# Patient Record
Sex: FEMALE | Race: BLACK OR AFRICAN AMERICAN | Employment: FULL TIME | URBAN - METROPOLITAN AREA
[De-identification: names, ages, dates, MRNs, and addresses within clinical notes are randomized per-mention and may not be internally consistent; named-entity substitution may affect disease eponyms.]

---

## 2017-03-22 LAB
HPV COMMENT: NORMAL
HPV TYPE 16: NOT DETECTED
HPV TYPE 18: NOT DETECTED
HPVOH (OTHER TYPES): NOT DETECTED

## 2018-01-12 ENCOUNTER — OFFICE VISIT (OUTPATIENT)
Dept: URGENT CARE | Age: 48
End: 2018-01-12

## 2018-01-12 VITALS
WEIGHT: 152 LBS | BODY MASS INDEX: 30.64 KG/M2 | SYSTOLIC BLOOD PRESSURE: 112 MMHG | HEART RATE: 86 BPM | DIASTOLIC BLOOD PRESSURE: 76 MMHG | TEMPERATURE: 98.3 F | HEIGHT: 59 IN | RESPIRATION RATE: 12 BRPM

## 2018-01-12 DIAGNOSIS — J02.9 SORE THROAT: ICD-10-CM

## 2018-01-12 DIAGNOSIS — J06.9 ACUTE URI: Primary | ICD-10-CM

## 2018-01-12 DIAGNOSIS — R52 BODY ACHES: ICD-10-CM

## 2018-01-12 LAB
INFLUENZA A ANTIBODY: NORMAL
INFLUENZA B ANTIBODY: NORMAL
S PYO AG THROAT QL: NORMAL

## 2018-01-12 PROCEDURE — 87880 STREP A ASSAY W/OPTIC: CPT | Performed by: EMERGENCY MEDICINE

## 2018-01-12 PROCEDURE — 87804 INFLUENZA ASSAY W/OPTIC: CPT | Performed by: EMERGENCY MEDICINE

## 2018-01-12 PROCEDURE — 99203 OFFICE O/P NEW LOW 30 MIN: CPT | Performed by: EMERGENCY MEDICINE

## 2018-01-12 RX ORDER — BENZONATATE 200 MG/1
200 CAPSULE ORAL 3 TIMES DAILY PRN
Qty: 15 CAPSULE | Refills: 0 | Status: SHIPPED | OUTPATIENT
Start: 2018-01-12 | End: 2021-02-18

## 2018-01-12 RX ORDER — NITROFURANTOIN 25; 75 MG/1; MG/1
100 CAPSULE ORAL 2 TIMES DAILY
COMMUNITY
End: 2021-02-18

## 2018-01-12 RX ORDER — GUAIFENESIN DEXTROMETHORPHAN HYDROBROMIDE ORAL SOLUTION 10; 100 MG/5ML; MG/5ML
10 SOLUTION ORAL EVERY 4 HOURS PRN
Qty: 237 ML | Refills: 0 | Status: SHIPPED | OUTPATIENT
Start: 2018-01-12 | End: 2021-02-18

## 2018-01-12 ASSESSMENT — ENCOUNTER SYMPTOMS
ABDOMINAL PAIN: 0
RHINORRHEA: 1
EYE PAIN: 0
DIARRHEA: 0
COUGH: 1
SORE THROAT: 1
SHORTNESS OF BREATH: 0
WHEEZING: 0
VOMITING: 0
NAUSEA: 0
EYE DISCHARGE: 0
TROUBLE SWALLOWING: 0

## 2018-01-12 NOTE — PATIENT INSTRUCTIONS
RX: TESSALON PERLES, TUSSIN DM MAX    Throat sprays, lozenges, salt-water gargles for comfort  Tylenol or Ibuprofen for pain/fever (may alternate up to every 3 hours as needed)  Plenty of fluids    Follow-up with your primary care physician in 1 week if not improving. If you do not have a primary care physician, call [phone] for a referral or visit [email or web address]    Continue Bactrim DS for UTI as directed      Patient Education        Upper Respiratory Infection (Cold): Care Instructions  Your Care Instructions    An upper respiratory infection, or URI, is an infection of the nose, sinuses, or throat. URIs are spread by coughs, sneezes, and direct contact. The common cold is the most frequent kind of URI. The flu and sinus infections are other kinds of URIs. Almost all URIs are caused by viruses. Antibiotics won't cure them. But you can treat most infections with home care. This may include drinking lots of fluids and taking over-the-counter pain medicine. You will probably feel better in 4 to 10 days. The doctor has checked you carefully, but problems can develop later. If you notice any problems or new symptoms, get medical treatment right away. Follow-up care is a key part of your treatment and safety. Be sure to make and go to all appointments, and call your doctor if you are having problems. It's also a good idea to know your test results and keep a list of the medicines you take. How can you care for yourself at home? · To prevent dehydration, drink plenty of fluids, enough so that your urine is light yellow or clear like water. Choose water and other caffeine-free clear liquids until you feel better. If you have kidney, heart, or liver disease and have to limit fluids, talk with your doctor before you increase the amount of fluids you drink. · Take an over-the-counter pain medicine, such as acetaminophen (Tylenol), ibuprofen (Advil, Motrin), or naproxen (Aleve).  Read and follow all instructions on the label. · Before you use cough and cold medicines, check the label. These medicines may not be safe for young children or for people with certain health problems. · Be careful when taking over-the-counter cold or flu medicines and Tylenol at the same time. Many of these medicines have acetaminophen, which is Tylenol. Read the labels to make sure that you are not taking more than the recommended dose. Too much acetaminophen (Tylenol) can be harmful. · Get plenty of rest.  · Do not smoke or allow others to smoke around you. If you need help quitting, talk to your doctor about stop-smoking programs and medicines. These can increase your chances of quitting for good. When should you call for help? Call 911 anytime you think you may need emergency care. For example, call if:  ? · You have severe trouble breathing. ?Call your doctor now or seek immediate medical care if:  ? · You seem to be getting much sicker. ? · You have new or worse trouble breathing. ? · You have a new or higher fever. ? · You have a new rash. ? Watch closely for changes in your health, and be sure to contact your doctor if:  ? · You have a new symptom, such as a sore throat, an earache, or sinus pain. ? · You cough more deeply or more often, especially if you notice more mucus or a change in the color of your mucus. ? · You do not get better as expected. Where can you learn more? Go to https://[employer]. org and sign in to your doxo account. Enter K685 in the [employer] box to learn more about \"Upper Respiratory Infection (Cold): Care Instructions. \"     If you do not have an account, please click on the \"Sign Up Now\" link. Current as of: May 12, 2017  Content Version: 11.5  © 7307-7483 Healthwise, Incorporated. Care instructions adapted under license by [facility] ([facility]).  If you have questions about a medical condition or this instruction, always ask your healthcare professional.

## 2018-01-12 NOTE — LETTER
[address] [facility] [address] [place] [id number]  Phone: [phone]  Fax: [phone]    Soila Menendez MD        January 12, 2018     Patient: Candace Ellington   YOB: 1970   Date of Visit: 1/12/2018       To Whom it May Concern:    Elida Kingston was seen in my clinic on 1/12/2018. She may return to work on 1/13/2018. If you have any questions or concerns, please don't hesitate to call.     Sincerely,         Soila Menendez MD

## 2018-01-12 NOTE — PROGRESS NOTES
Reason for Visit:   Chief Complaint   Patient presents with    URI     pt complaining of bodyaches, congestion, sore throat and chills since yesterday. Patient History     HPI:    URI    This is a new problem. The current episode started yesterday. Maximum temperature: + subjective fever. Associated symptoms include congestion, coughing (slight), headaches, neck pain (muscle soreness), a plugged ear sensation, rhinorrhea, sneezing and a sore throat. Pertinent negatives include no abdominal pain, chest pain, diarrhea, dysuria (but has been treated with Bactrim DS for a UTI x 3 days), ear pain, nausea, rash, vomiting or wheezing. She has tried nothing for the symptoms. Past Medical History:   Diagnosis Date    Anxiety 2012    Asthma     Depression 2013    DVT of lower extremity, bilateral ([address]  and     while pregnant    Dysmenorrhea 10/14/2011    Insomnia 2013    Migraine headache 11/10/2010    Obesity 2010    Panic attacks 10/17/2012       Past Surgical History:   Procedure Laterality Date     SECTION  1991     SECTION  1995   Darcy Johnson ECTOPIC PREGNANCY SURGERY  2002    right salpingo-oopherectomy    ENDOMETRIAL ABLATION  2012    KNEE ARTHROSCOPY Right     Dr. Salvador [address] [facility]  1995       Allergies:    Allergies   Allergen Reactions    Amoxicillin Hives, Shortness Of Breath, Itching and Swelling     tongue swelling    Penicillins Hives, Shortness Of Breath, Itching and Swelling    Red Dye Hives, Itching and Swelling     The patient had a skin reaction to a red hair dye NOT oral medicines          Review of Systems       Review of Systems   Constitutional: Positive for chills and ?Call your doctor now or seek immediate medical care if:  ? · You seem to be getting much sicker. ? · You have new or worse trouble breathing. ? · You have a new or higher fever. ? · You have a new rash. ? Watch closely for changes in your health, and be sure to contact your doctor if:  ? · You have a new symptom, such as a sore throat, an earache, or sinus pain. ? · You cough more deeply or more often, especially if you notice more mucus or a change in the color of your mucus. ? · You do not get better as expected. Where can you learn more? Go to https://Lingvistpepiceweb.[employer]ner[employer]. org and sign in to your [employer] account. Enter W729 in the [employer] box to learn more about \"Upper Respiratory Infection (Cold): Care Instructions. \"     If you do not have an account, please click on the \"Sign Up Now\" link. Current as of: May 12, 2017  Content Version: 11.5  © 5817-8692 Healthwise, Incorporated. Care instructions adapted under license by [facility] ([facility]). If you have questions about a medical condition or this instruction, always ask your healthcare professional. [address] any warranty or liability for your use of this information.

## 2018-03-12 ENCOUNTER — EMPLOYEE WELLNESS (OUTPATIENT)
Dept: OTHER | Age: 48
End: 2018-03-12

## 2018-03-12 LAB
CHOLESTEROL, TOTAL: 257 MG/DL (ref 0–199)
GLUCOSE BLD-MCNC: 69 MG/DL (ref 70–99)
HDLC SERPL-MCNC: 92 MG/DL (ref 40–60)
LDL CHOLESTEROL CALCULATED: 151 MG/DL
TRIGL SERPL-MCNC: 68 MG/DL (ref 0–150)

## 2018-04-02 VITALS — BODY MASS INDEX: 28.48 KG/M2 | WEIGHT: 141 LBS

## 2019-02-04 ENCOUNTER — HOSPITAL ENCOUNTER (EMERGENCY)
Age: 49
Discharge: HOME | End: 2019-02-04
Payer: COMMERCIAL

## 2019-02-04 VITALS
HEART RATE: 88 BPM | BODY MASS INDEX: 28.43 KG/M2 | TEMPERATURE: 98.2 F | WEIGHT: 141 LBS | DIASTOLIC BLOOD PRESSURE: 76 MMHG | OXYGEN SATURATION: 100 % | RESPIRATION RATE: 18 BRPM | SYSTOLIC BLOOD PRESSURE: 136 MMHG | HEIGHT: 59 IN

## 2019-02-04 DIAGNOSIS — J02.9 ACUTE PHARYNGITIS, UNSPECIFIED ETIOLOGY: Primary | ICD-10-CM

## 2019-02-04 LAB
RAPID INFLUENZA  B AGN: NEGATIVE
RAPID INFLUENZA A AGN: NEGATIVE
S PYO AG THROAT QL: NEGATIVE

## 2019-02-04 PROCEDURE — 87081 CULTURE SCREEN ONLY: CPT

## 2019-02-04 PROCEDURE — 87880 STREP A ASSAY W/OPTIC: CPT

## 2019-02-04 PROCEDURE — 6360000002 HC RX W HCPCS: Performed by: PHYSICIAN ASSISTANT

## 2019-02-04 PROCEDURE — 6370000000 HC RX 637 (ALT 250 FOR IP): Performed by: PHYSICIAN ASSISTANT

## 2019-02-04 PROCEDURE — 87804 INFLUENZA ASSAY W/OPTIC: CPT

## 2019-02-04 PROCEDURE — 99282 EMERGENCY DEPT VISIT SF MDM: CPT

## 2019-02-04 PROCEDURE — 96372 THER/PROPH/DIAG INJ SC/IM: CPT

## 2019-02-04 RX ORDER — KETOROLAC TROMETHAMINE 30 MG/ML
30 INJECTION, SOLUTION INTRAMUSCULAR; INTRAVENOUS ONCE
Status: COMPLETED | OUTPATIENT
Start: 2019-02-04 | End: 2019-02-04

## 2019-02-04 RX ORDER — AZITHROMYCIN 250 MG/1
TABLET, FILM COATED ORAL
Qty: 1 PACKET | Refills: 0 | Status: SHIPPED | OUTPATIENT
Start: 2019-02-04 | End: 2021-02-18

## 2019-02-04 RX ADMIN — LIDOCAINE HYDROCHLORIDE 15 ML: 20 SOLUTION ORAL; TOPICAL at 03:02

## 2019-02-04 RX ADMIN — KETOROLAC TROMETHAMINE 30 MG: 30 INJECTION, SOLUTION INTRAMUSCULAR at 03:06

## 2019-02-04 ASSESSMENT — PAIN DESCRIPTION - PAIN TYPE: TYPE: ACUTE PAIN

## 2019-02-04 ASSESSMENT — PAIN DESCRIPTION - LOCATION: LOCATION: THROAT

## 2019-02-04 ASSESSMENT — PAIN SCALES - GENERAL
PAINLEVEL_OUTOF10: 8
PAINLEVEL_OUTOF10: 7

## 2019-02-06 LAB — S PYO THROAT QL CULT: NORMAL

## 2019-05-06 ENCOUNTER — NURSE TRIAGE (OUTPATIENT)
Dept: OTHER | Facility: CLINIC | Age: 49
End: 2019-05-06

## 2019-05-06 NOTE — TELEPHONE ENCOUNTER
Reason for Disposition   [1] MODERATE back pain (e.g., interferes with normal activities) AND [2] present > 3 days    Protocols used: BACK PAIN-ADULT-    The patient has developed the following symptoms since 1300 on 5/5/19 per caller:  - lower right back pain. Pain level at its highest is 6/10. Pain shoots to 6/10 when she goes to stand up. Patient denies any injury or recent overactivity. Denies fever. Patient is alert and oriented x4  Able to answer questions appropriately and clearly. Stated Chris Robert is a small strip of numbness on my right side. \"    Speaks clearly in complete sentences without getting winded. Small amount of swelling noted to lower right back. Denies any issues with urinating. All information documented in this encounter was provided by the caller/patient via the telephone. Caller informed that if there are any other questions/concerns or if the problem gets worse to call us back as we are here 24 hours a day.

## 2019-12-25 ENCOUNTER — NURSE TRIAGE (OUTPATIENT)
Dept: OTHER | Facility: CLINIC | Age: 49
End: 2019-12-25

## 2020-10-06 ENCOUNTER — OFFICE VISIT (OUTPATIENT)
Dept: PRIMARY CARE CLINIC | Age: 50
End: 2020-10-06
Payer: COMMERCIAL

## 2020-10-06 PROCEDURE — 99211 OFF/OP EST MAY X REQ PHY/QHP: CPT | Performed by: NURSE PRACTITIONER

## 2020-10-06 NOTE — PROGRESS NOTES
Charissa Joanna received a viral test for COVID-19. They were educated on isolation and quarantine as appropriate. For any symptoms, they were directed to seek care from their PCP, given contact information to establish with a doctor, directed to an urgent care or the emergency room.

## 2020-10-08 LAB — SARS-COV-2, NAA: DETECTED

## 2020-11-24 ENCOUNTER — TELEPHONE (OUTPATIENT)
Dept: BARIATRICS/WEIGHT MGMT | Age: 50
End: 2020-11-24

## 2020-11-25 NOTE — TELEPHONE ENCOUNTER
Spoke with patient and explained our protocol and process and she is very appreciative of calling and explaining. She wants to proceed with usual planning.

## 2020-12-10 ENCOUNTER — TELEPHONE (OUTPATIENT)
Dept: ADMINISTRATIVE | Age: 50
End: 2020-12-10

## 2020-12-11 ENCOUNTER — TELEPHONE (OUTPATIENT)
Dept: BARIATRICS/WEIGHT MGMT | Age: 50
End: 2020-12-11

## 2020-12-11 NOTE — TELEPHONE ENCOUNTER
Patient was sent Dr. Colletta Mourning digital bariatric seminar. She DOES have BWLS coverage (3mos consec) Spoke w/pt, info given. Pt verbalized understanding. Appt sched. pk mailed. No prev wt loss sx, BMI >40 per patient.  Thanks

## 2021-01-15 ENCOUNTER — OFFICE VISIT (OUTPATIENT)
Dept: BARIATRICS/WEIGHT MGMT | Age: 51
End: 2021-01-15
Payer: COMMERCIAL

## 2021-01-15 VITALS
BODY MASS INDEX: 43.67 KG/M2 | TEMPERATURE: 97.1 F | SYSTOLIC BLOOD PRESSURE: 126 MMHG | HEIGHT: 59 IN | OXYGEN SATURATION: 98 % | DIASTOLIC BLOOD PRESSURE: 78 MMHG | HEART RATE: 100 BPM | WEIGHT: 216.6 LBS | RESPIRATION RATE: 18 BRPM

## 2021-01-15 DIAGNOSIS — M54.50 CHRONIC MIDLINE LOW BACK PAIN WITHOUT SCIATICA: ICD-10-CM

## 2021-01-15 DIAGNOSIS — Z01.818 PREOPERATIVE CLEARANCE: ICD-10-CM

## 2021-01-15 DIAGNOSIS — K21.9 CHRONIC GERD: Primary | ICD-10-CM

## 2021-01-15 DIAGNOSIS — E66.01 MORBID OBESITY WITH BMI OF 40.0-44.9, ADULT (HCC): ICD-10-CM

## 2021-01-15 DIAGNOSIS — G89.29 CHRONIC MIDLINE LOW BACK PAIN WITHOUT SCIATICA: ICD-10-CM

## 2021-01-15 DIAGNOSIS — E78.2 MIXED HYPERLIPIDEMIA: ICD-10-CM

## 2021-01-15 PROCEDURE — 99205 OFFICE O/P NEW HI 60 MIN: CPT | Performed by: SURGERY

## 2021-01-15 RX ORDER — AMITRIPTYLINE HYDROCHLORIDE 25 MG/1
25 TABLET, FILM COATED ORAL NIGHTLY
COMMUNITY
Start: 2020-12-30

## 2021-01-15 NOTE — PROGRESS NOTES
Luis Eduardo Tellez is a 45 y.o. female with a date of birth of 1970. Vitals:    01/15/21 0958   BP: 126/78   Pulse: 100   Resp: 18   Temp: 97.1 °F (36.2 °C)   SpO2: 98%    BMI: Body mass index is 43.75 kg/m². Obesity Classification: Class III    Weight History: Wt Readings from Last 3 Encounters:   01/15/21 216 lb 9.6 oz (98.2 kg)   02/04/19 141 lb (64 kg)   03/12/18 141 lb (64 kg)       Patient's lowest adult weight was 99 lbs at age 24. Patient's highest adult weight was 216.6 lbs at age 45. Patient has participated in the following weight loss programs: Cabbage Soup Diet, the Zone, calorie restriction and physician supervised diet. Patient has participated in meal replacement/liquid diets - Slimfast  Patient has participated in weight loss medications - Adipex 1 year ago. Patient is  lactose intolerant - milk only. Patient does not have Ep[employer]opalian/cultural food concerns. Patient does not have food allergies. Patient does tolerate artificial sweeteners. Patient does have a regular sleep/wake schedule; she sleeps well. She does snore. 24 hour recall/food frequency chart:  Breakfast: yes. Coffee with creamer, steak and eggs OR toast and jelly  Snack: no.   Lunch: yes. Red Benjamin burger, ff OR Chickfila  Snack: yes. Chips or M&Ms  Dinner: yes. Eats out a lot - Sangeetha-Danielito, veg, rice OR Longhorn steak, potato, bread, 1 glass wine  Snack: yes. Cookies, glass of milk Lactaid OR fresh fruit  Drinks throughout the day: water, 2 cans soda  Do you drink alcohol? Yes. How often/how much alcohol do you drink: 2 Glasses of wine per week. Patient does not meet the criteria for binge eating disorder. Patient does not have grazing. Patient does not have night eating. Patient does have a history of emotional eating or eating out of boredom. Surgery  Patient does feel confident in her ability to make these changes. The patient's expectations of post-surgical eating habits are realistic.     Patient states she does understand the consequences of not complying with post-op food guidelines. Patient states she does understands the long term changes in food intake that will be necessary for all occasions after surgery for the rest of her life. Patient is deemed nutritionally appropriate to proceed. Goals  Weight: 135  Health Improvement: avoid weight related illnesses, previous DVT, arthritis, migraines    Assessment  Nutritional Needs: RMR=(9.99 x 98) + (6.25 x 150) - (4.92 x 50 y.o.) -161  = 1504.5 kcal x 1.4 (sedentary activity factor)= 2106 kcal - 1000 (for 2 lb weight loss/week)= 1106 kcal.    Plan  Plan/Recommendations: Start presurgical guidelines. Goals:   -Eat 4-5 times daily  -Avoid high fat and high sugar foods  -Include protein with all meals and snacks  -Avoid carbonation and caffeine  -Avoid calorie containing beverages  -Increase physical activity as tolerated    PES Statement:  Overweight/Obesity related to lack of exercise, sedentary lifestyle, unhealthy eating habits, and unsuccessful diet attempts as evidenced by BMI. Body mass index is 43.75 kg/m². Will follow up as necessary.     Reyna Grubbs

## 2021-01-18 NOTE — PROGRESS NOTES
[facility]   [facility]  Hue Wharton MD, [facility], [address] [place] [address]    [place] [address] . Phone: [phone]  Fax: [phone]       Chief Complaint   Patient presents with    Bariatric, Initial Visit     NP, [address] [facility] [place]           HPI:    Ninoska Bishop is a very pleasant 45 y.o. obese female ,   Body mass index is 43.75 kg/m². And multiple medical problems who is presenting for weight loss surgery evaluation and consultation by Dr. Michael Cruz. Patient has been struggling for several years now with obesity. Patient feels the weight is an obstacle to achieve and perform things in daily living as well risk on health. Tries to diet, and exercise but can't keep the weight off. Patient tried [email or web address] Ã¨[id number]€[id number]€˜ Diet, the Zone, calorie restriction and physician supervised diet. Patient has participated in meal replacement/liquid diets - Slimfast  Patient has participated in weight loss medications - Adipex 1 year ago and other regimens, but with no sustainable weight loss. Patient  is very determined to lose weight and be healthy, and is interested in surgical weight loss for future weight loss. .    Otherwise patient denies any nausea, vomiting, fevers, chills, shortness of breath, chest pain, constipation or urinary symptoms.         Obesity related problems [place] is dealing with:  Patient Active Problem List   Diagnosis    Obesity    Asthma    Migraine headache    Dysmenorrhea    Right knee pain    Back pain    Constipation    Anxiety    Depression    Insomnia    Need for Tdap vaccination    Preoperative clearance    Morbid obesity with BMI of 40.0-44.9, adult (HCC)    Mixed hyperlipidemia    Chronic GERD    Chronic midline low back pain without sciatica           Pain Assessment   Denies any abdominal pain     Past Medical History:   Diagnosis Date    Anxiety 12/18/2012    Asthma     Depression 1/2/2013    DVT of Pulse: 100   Resp: 18   Temp: 97.1 °F (36.2 °C)   SpO2: 98%   Weight: 216 lb 9.6 oz (98.2 kg)   Height: 4' 11\" (1.499 m)       Body mass index is 43.75 kg/m². Current Outpatient Medications:     amitriptyline (ELAVIL) 25 MG tablet, Take 25 mg by mouth nightly, Disp: , Rfl:     albuterol (PROVENTIL HFA) 108 (90 BASE) MCG/ACT inhaler, Inhale 2 puffs into the lungs every 4 hours as needed for Wheezing or Shortness of Breath., Disp: 1 Inhaler, Rfl: 12    azithromycin (ZITHROMAX) 250 MG tablet, Take 2 tablets (500 mg) on Day 1, followed by 1 tablet (250 mg) once daily on Days 2 through 5. (Patient not taking: Reported on 1/15/2021), Disp: 1 packet, Rfl: 0    Magic Mouthwash (MIRACLE MOUTHWASH), Swish and swallow 10 mLs 4 times daily as needed (throat pain) Equal parts lidocaine, benadryl, maalox. Gargle then swallow (Patient not taking: Reported on 1/15/2021), Disp: 240 mL, Rfl: 0    ibuprofen (CHILDRENS ADVIL) 100 MG/5ML suspension, Take 20 mLs by mouth every 6 hours as needed for Pain or Fever 800mg max per dose (Patient not taking: Reported on 1/15/2021), Disp: 1 Bottle, Rfl: 0    nitrofurantoin, macrocrystal-monohydrate, (MACROBID) 100 MG capsule, Take 100 mg by mouth 2 times daily, Disp: , Rfl:     benzonatate (TESSALON) 200 MG capsule, Take 1 capsule by mouth 3 times daily as needed for Cough (cough) (Patient not taking: Reported on 1/15/2021), Disp: 15 capsule, Rfl: 0    dextromethorphan-guaiFENesin (ROBITUSSIN-DM)  MG/5ML syrup, Take 10 mLs by mouth every 4 hours as needed for Cough (Patient not taking: Reported on 1/15/2021), Disp: 237 mL, Rfl: 0    diazepam (VALIUM) 5 MG tablet, Take 1 tablet by mouth nightly as needed for Anxiety or Sleep (Patient not taking: Reported on 1/15/2021), Disp: 5 tablet, Rfl: 0    benzonatate (TESSALON PERLES) 100 MG capsule, Take 100 mg by mouth 3 times daily as needed for Cough. , Disp: , Rfl:     zolpidem (AMBIEN) 10 MG tablet, Take 1 tablet by mouth nightly as needed (for insomnia). (Patient not taking: Reported on 1/15/2021), Disp: 30 tablet, Rfl: 5    albuterol (PROVENTIL) (2.5 MG/3ML) 0.083% nebulizer solution, Take 3 mLs by nebulization Daily. (Patient not taking: Reported on 1/15/2021), Disp: 30 vial, Rfl: 12      Review of Systems - History obtained from the patient  General ROS: overweight   Psychological ROS: negative  Ophthalmic ROS: negative  Neurological ROS: negative  ENT ROS: negative  Allergy and Immunology ROS: negative  Hematological and Lymphatic ROS: negative  Endocrine ROS: overweight  Breast ROS: negative  Respiratory ROS: negative  Cardiovascular ROS: negative  Gastrointestinal ROS:negative  Genito-Urinary ROS: negative  Musculoskeletal ROS: weight effects on joints  Skin ROS: negative    Physical Exam   Constitutional: Patient is oriented to person, place, and time. Vital signs are normal. Patient  appears well-developed and well-nourished. Patient  is active and cooperative. Non-toxic appearance. No distress. HENT:   Head: Normocephalic and atraumatic. Head is without laceration. Right Ear: External ear normal. No lacerations. No drainage, swelling or tenderness. Left Ear: External ear normal. No lacerations. No drainage, swelling or tenderness. Nose: Nose normal. No nose lacerations or nasal deformity. Mouth/Throat: Patient is wearing mask due to Covid-19 pandemic precautions, following CDC and health authorities guidelines. Eyes: Conjunctivae, EOM and lids are normal. Pupils are equal, round, and reactive to light. Right eye exhibits no discharge. No foreign body present in the right eye. Left eye exhibits no discharge. No foreign body present in the left eye. No scleral icterus. Neck: Trachea normal and normal range of motion. Neck supple. No JVD present. No tracheal tenderness present. Carotid bruit is not present. No rigidity. No tracheal deviation and no edema present. No thyromegaly present.    Cardiovascular: Normal rate, regular rhythm, normal heart sounds, intact distal pulses and normal pulses. Pulmonary/Chest: Effort normal and breath sounds normal. No stridor. No respiratory distress. Patient  has no wheezes. Patient has no rales. Patient exhibits no tenderness and no crepitus. Abdominal: Soft. Normal appearance and bowel sounds are normal. Patient exhibits no distension, no abdominal bruit, no ascites and no mass. There is no hepatosplenomegaly. There is no tenderness. There is no rigidity, no rebound, no guarding and no CVA tenderness. No hernia. Hernia confirmed negative in the ventral area. Musculoskeletal: Normal range of motion. Patient exhibits no edema or tenderness. Lymphadenopathy:        Head (right side): No submental, no submandibular, no preauricular, no posterior auricular and no occipital adenopathy present. Head (left side): No submental, no submandibular, no preauricular, no posterior auricular and no occipital adenopathy present. Patient  has no cervical adenopathy. Right: No supraclavicular adenopathy present. Left: No supraclavicular adenopathy present. Neurological: Patient is alert and oriented to person, place, and time. Patient has normal strength. Coordination and gait normal. GCS eye subscore is 4. GCS verbal subscore is 5. GCS motor subscore is 6. Skin: Skin is warm and dry. No abrasion and no rash noted. Patient  is not diaphoretic. No cyanosis or erythema. Psychiatric: Patient has a normal mood and affect. speech is normal and behavior is normal. Cognition and memory are normal.         [facility] was seen today for bariatric, initial visit. Diagnoses and all orders for this visit:    Chronic GERD  -     CBC Auto Differential; Future  -     Comprehensive Metabolic Panel; Future  -     Hemoglobin A1C; Future  -     Iron and TIBC; Future  -     Lipid Panel; Future  -     TSH with Reflex;  Future  -     Vitamin A; Future  -     Vitamin B1, Whole Blood; Future  -     Vitamin B12 & Folate; Future  -     Vitamin D 25 Hydroxy; Future  -     Vitamin E; Future  -     Protime-INR; Future  -     Ambulatory referral to Cardiology    Morbid obesity with BMI of 40.0-44.9, adult (HCC)  -     CBC Auto Differential; Future  -     Comprehensive Metabolic Panel; Future  -     Hemoglobin A1C; Future  -     Iron and TIBC; Future  -     Lipid Panel; Future  -     TSH with Reflex; Future  -     Vitamin A; Future  -     Vitamin B1, Whole Blood; Future  -     Vitamin B12 & Folate; Future  -     Vitamin D 25 Hydroxy; Future  -     Vitamin E; Future  -     Protime-INR; Future  -     Ambulatory referral to Cardiology    Preoperative clearance  -     CBC Auto Differential; Future  -     Comprehensive Metabolic Panel; Future  -     Hemoglobin A1C; Future  -     Iron and TIBC; Future  -     Lipid Panel; Future  -     TSH with Reflex; Future  -     Vitamin A; Future  -     Vitamin B1, Whole Blood; Future  -     Vitamin B12 & Folate; Future  -     Vitamin D 25 Hydroxy; Future  -     Vitamin E; Future  -     Protime-INR; Future  -     Ambulatory referral to Cardiology    Mixed hyperlipidemia  -     CBC Auto Differential; Future  -     Comprehensive Metabolic Panel; Future  -     Hemoglobin A1C; Future  -     Iron and TIBC; Future  -     Lipid Panel; Future  -     TSH with Reflex; Future  -     Vitamin A; Future  -     Vitamin B1, Whole Blood; Future  -     Vitamin B12 & Folate; Future  -     Vitamin D 25 Hydroxy; Future  -     Vitamin E; Future  -     Protime-INR; Future  -     Ambulatory referral to Cardiology    Chronic midline low back pain without sciatica  -     CBC Auto Differential; Future  -     Comprehensive Metabolic Panel; Future  -     Hemoglobin A1C; Future  -     Iron and TIBC; Future  -     Lipid Panel; Future  -     TSH with Reflex; Future  -     Vitamin A; Future  -     Vitamin B1, Whole Blood; Future  -     Vitamin B12 & Folate; Future  -     Vitamin D 25 Hydroxy;  Future  - Vitamin E; Future  -     Protime-INR; Future  -     Ambulatory referral to Cardiology          A/P  Karma Locke is a very pleasant 45 y.o. female with Obesity,  Body mass index is 43.75 kg/m². and multiple obesity related co-morbidities. Karma Locke is very motivated to lose weight and being more healthy. We discussed how her weight affects her overall health including:  Patient Active Problem List   Diagnosis    Obesity    Asthma    Migraine headache    Dysmenorrhea    Right knee pain    Back pain    Constipation    Anxiety    Depression    Insomnia    Need for Tdap vaccination    Preoperative clearance    Morbid obesity with BMI of 40.0-44.9, adult (HCC)    Mixed hyperlipidemia    Chronic GERD    Chronic midline low back pain without sciatica      The patient underwent extensive dietary counseling. I have reviewed, discussed and agree with the dietary plan. Medical weight loss and different surgical options were discussed in details with patient. Iliana Marquez is interested in surgical weight loss for future weight loss. Patient is interested in Laparoscopic Sleeve Gastrectomy, which I believe is an excellent option. We will proceed with pre-operative work up labs and studies. Will also petition patient's  insurance for approval for this procedure. I advised the patient that we can't guarantee final insurance approval.    Patient received dietary handouts and education. Patient advised that its their responsibility to follow up for studies, referrals and/or labs ordered today. Also discussed in details the importance of follow up, as well following the recommendations and completing the whole program to improve outcomes when it comes to healthier lifestyle as well weight loss. Patient also advised about risks and benefits being on a strict dietary regimen as well using supplements.  Patient agrees and wants to proceed with weight loss planning     Obesity as a disease is considered a high risk to patients overall health and should therefore be considered a high risk disease state. Now with Covid-19 pandemic, CDC and health authorities does classify obese patients as vulnerable and high risk as well. Which makes weight loss a priority for improvement of their wellbeing and overall health. [facility] has issued the following statement as far Obese patients being at Increased Risk of being critically ill from SARS-Cov-2  \"Severe obesity increases the risk of a serious breathing problem called acute respiratory distress syndrome (ARDS), which is a major complication of NZHUW-46 and can cause difficulties with a doctors ability to provide respiratory support for seriously ill patients. People living with severe obesity can have multiple serious chronic diseases and underlying health conditions that can increase the risk of severe illness from COVID-19. \"       Patient Instructions   Patient received dietary handouts and education. Pre-operative work up Ordered:    - [employer]. - Psych Evaluation.   - Cardiac Clearance. - EGD (Upper Endoscopy). - Support Group Attendance. - Obtain letter of medical necessity (PCP Letter). - Quit Smoking,  Alcohol, Caffeine and Carbonated Drinks  - Obtain records for Weight History 1 yrs. - Start Regular Exercise and track your activities. - Start Tracking your food Intake and follow dietary guidelines. - Avoid Pregnancy for 2 yrs from date of surgery. (for female patients in childbearing age)  - F/U in 4 weeks. Patient advised that its their responsibility to follow up for studies, referrals and/or labs ordered today. Please note that some or all of this report was generated using voice recognition software. Please notify me in case of any questions about the content of this document, as some errors in transcription may have occurred .

## 2021-02-10 ENCOUNTER — HOSPITAL ENCOUNTER (OUTPATIENT)
Age: 51
Discharge: HOME | End: 2021-02-10
Payer: COMMERCIAL

## 2021-02-10 DIAGNOSIS — M54.50 CHRONIC MIDLINE LOW BACK PAIN WITHOUT SCIATICA: ICD-10-CM

## 2021-02-10 DIAGNOSIS — G89.29 CHRONIC MIDLINE LOW BACK PAIN WITHOUT SCIATICA: ICD-10-CM

## 2021-02-10 DIAGNOSIS — E78.2 MIXED HYPERLIPIDEMIA: ICD-10-CM

## 2021-02-10 DIAGNOSIS — K21.9 CHRONIC GERD: ICD-10-CM

## 2021-02-10 DIAGNOSIS — E66.01 MORBID OBESITY WITH BMI OF 40.0-44.9, ADULT (HCC): ICD-10-CM

## 2021-02-10 DIAGNOSIS — Z01.818 PREOPERATIVE CLEARANCE: ICD-10-CM

## 2021-02-10 LAB
A/G RATIO: 1.3 (ref 1.1–2.2)
ALBUMIN SERPL-MCNC: 4.5 G/DL (ref 3.4–5)
ALP BLD-CCNC: 77 U/L (ref 40–129)
ALT SERPL-CCNC: 10 U/L (ref 10–40)
ANION GAP SERPL CALCULATED.3IONS-SCNC: 13 MMOL/L (ref 3–16)
AST SERPL-CCNC: 17 U/L (ref 15–37)
BASOPHILS ABSOLUTE: 0 K/UL (ref 0–0.2)
BASOPHILS RELATIVE PERCENT: 0.6 %
BILIRUB SERPL-MCNC: <0.2 MG/DL (ref 0–1)
BUN BLDV-MCNC: 9 MG/DL (ref 7–20)
CALCIUM SERPL-MCNC: 10 MG/DL (ref 8.3–10.6)
CHLORIDE BLD-SCNC: 102 MMOL/L (ref 99–110)
CHOLESTEROL, TOTAL: 261 MG/DL (ref 0–199)
CO2: 25 MMOL/L (ref 21–32)
CREAT SERPL-MCNC: 0.9 MG/DL (ref 0.6–1.1)
EOSINOPHILS ABSOLUTE: 0.1 K/UL (ref 0–0.6)
EOSINOPHILS RELATIVE PERCENT: 2.7 %
FOLATE: 18.55 NG/ML (ref 4.78–24.2)
GFR AFRICAN AMERICAN: >60
GFR NON-AFRICAN AMERICAN: >60
GLOBULIN: 3.4 G/DL
GLUCOSE BLD-MCNC: 82 MG/DL (ref 70–99)
HCT VFR BLD CALC: 36.8 % (ref 36–48)
HDLC SERPL-MCNC: 71 MG/DL (ref 40–60)
HEMOGLOBIN: 11.6 G/DL (ref 12–16)
INR BLD: 1.03 (ref 0.86–1.14)
IRON SATURATION: 15 % (ref 15–50)
IRON: 47 UG/DL (ref 37–145)
LDL CHOLESTEROL CALCULATED: 176 MG/DL
LYMPHOCYTES ABSOLUTE: 1.9 K/UL (ref 1–5.1)
LYMPHOCYTES RELATIVE PERCENT: 41.3 %
MCH RBC QN AUTO: 27.5 PG (ref 26–34)
MCHC RBC AUTO-ENTMCNC: 31.6 G/DL (ref 31–36)
MCV RBC AUTO: 86.9 FL (ref 80–100)
MONOCYTES ABSOLUTE: 0.2 K/UL (ref 0–1.3)
MONOCYTES RELATIVE PERCENT: 4.7 %
NEUTROPHILS ABSOLUTE: 2.3 K/UL (ref 1.7–7.7)
NEUTROPHILS RELATIVE PERCENT: 50.7 %
PDW BLD-RTO: 14.7 % (ref 12.4–15.4)
PLATELET # BLD: 345 K/UL (ref 135–450)
PMV BLD AUTO: 8.1 FL (ref 5–10.5)
POTASSIUM SERPL-SCNC: 4 MMOL/L (ref 3.5–5.1)
PROTHROMBIN TIME: 11.9 SEC (ref 10–13.2)
RBC # BLD: 4.24 M/UL (ref 4–5.2)
SODIUM BLD-SCNC: 140 MMOL/L (ref 136–145)
TOTAL IRON BINDING CAPACITY: 308 UG/DL (ref 260–445)
TOTAL PROTEIN: 7.9 G/DL (ref 6.4–8.2)
TRIGL SERPL-MCNC: 68 MG/DL (ref 0–150)
TSH REFLEX: 2.47 UIU/ML (ref 0.27–4.2)
VITAMIN B-12: 1376 PG/ML (ref 211–911)
VITAMIN D 25-HYDROXY: 32.8 NG/ML
VLDLC SERPL CALC-MCNC: 14 MG/DL
WBC # BLD: 4.6 K/UL (ref 4–11)

## 2021-02-10 PROCEDURE — 85025 COMPLETE CBC W/AUTO DIFF WBC: CPT

## 2021-02-10 PROCEDURE — 85610 PROTHROMBIN TIME: CPT

## 2021-02-10 PROCEDURE — 84590 ASSAY OF VITAMIN A: CPT

## 2021-02-10 PROCEDURE — 83550 IRON BINDING TEST: CPT

## 2021-02-10 PROCEDURE — 80053 COMPREHEN METABOLIC PANEL: CPT

## 2021-02-10 PROCEDURE — 83540 ASSAY OF IRON: CPT

## 2021-02-10 PROCEDURE — 36415 COLL VENOUS BLD VENIPUNCTURE: CPT

## 2021-02-10 PROCEDURE — 82746 ASSAY OF FOLIC ACID SERUM: CPT

## 2021-02-10 PROCEDURE — 84425 ASSAY OF VITAMIN B-1: CPT

## 2021-02-10 PROCEDURE — 84446 ASSAY OF VITAMIN E: CPT

## 2021-02-10 PROCEDURE — 82607 VITAMIN B-12: CPT

## 2021-02-10 PROCEDURE — 80061 LIPID PANEL: CPT

## 2021-02-10 PROCEDURE — 83036 HEMOGLOBIN GLYCOSYLATED A1C: CPT

## 2021-02-10 PROCEDURE — 84443 ASSAY THYROID STIM HORMONE: CPT

## 2021-02-10 PROCEDURE — 82306 VITAMIN D 25 HYDROXY: CPT

## 2021-02-11 LAB
ESTIMATED AVERAGE GLUCOSE: 93.9 MG/DL
HBA1C MFR BLD: 4.9 %

## 2021-02-13 LAB
ALPHA-TOCOPHEROL: 10.8 MG/L (ref 5.5–18)
GAMMA-TOCOPHEROL: 1.3 MG/L (ref 0–6)
RETINYL PALMITATE: <0.02 MG/L (ref 0–0.1)
VITAMIN A LEVEL: 0.72 MG/L (ref 0.3–1.2)
VITAMIN A, INTERP: NORMAL

## 2021-02-14 PROBLEM — Z01.818 PREOPERATIVE CLEARANCE: Status: RESOLVED | Noted: 2021-01-15 | Resolved: 2021-02-14

## 2021-02-14 LAB — VITAMIN B1 WHOLE BLOOD: 169 NMOL/L (ref 70–180)

## 2021-02-18 ENCOUNTER — OFFICE VISIT (OUTPATIENT)
Dept: CARDIOLOGY CLINIC | Age: 51
End: 2021-02-18
Payer: COMMERCIAL

## 2021-02-18 VITALS
HEIGHT: 59 IN | SYSTOLIC BLOOD PRESSURE: 120 MMHG | RESPIRATION RATE: 23 BRPM | HEART RATE: 83 BPM | OXYGEN SATURATION: 96 % | DIASTOLIC BLOOD PRESSURE: 80 MMHG | WEIGHT: 210 LBS | BODY MASS INDEX: 42.33 KG/M2

## 2021-02-18 DIAGNOSIS — I34.0 NONRHEUMATIC MITRAL VALVE REGURGITATION: ICD-10-CM

## 2021-02-18 DIAGNOSIS — Z01.818 PRE-OP EXAM: Primary | ICD-10-CM

## 2021-02-18 DIAGNOSIS — R06.02 SHORTNESS OF BREATH: ICD-10-CM

## 2021-02-18 DIAGNOSIS — E78.2 MIXED HYPERLIPIDEMIA: ICD-10-CM

## 2021-02-18 PROCEDURE — 99204 OFFICE O/P NEW MOD 45 MIN: CPT | Performed by: INTERNAL MEDICINE

## 2021-02-18 PROCEDURE — 93000 ELECTROCARDIOGRAM COMPLETE: CPT | Performed by: INTERNAL MEDICINE

## 2021-02-18 RX ORDER — ATORVASTATIN CALCIUM 20 MG/1
20 TABLET, FILM COATED ORAL DAILY
Qty: 90 TABLET | Refills: 3 | Status: SHIPPED | OUTPATIENT
Start: 2021-02-18 | End: 2021-02-18

## 2021-02-18 RX ORDER — ATORVASTATIN CALCIUM 20 MG/1
20 TABLET, FILM COATED ORAL DAILY
Qty: 90 TABLET | Refills: 3 | Status: SHIPPED | OUTPATIENT
Start: 2021-02-18 | End: 2021-04-27 | Stop reason: ALTCHOICE

## 2021-02-18 SDOH — HEALTH STABILITY: MENTAL HEALTH: HOW OFTEN DO YOU HAVE A DRINK CONTAINING ALCOHOL?: 2-3 TIMES A WEEK

## 2021-02-18 SDOH — HEALTH STABILITY: MENTAL HEALTH: HOW MANY STANDARD DRINKS CONTAINING ALCOHOL DO YOU HAVE ON A TYPICAL DAY?: 1 OR 2

## 2021-02-18 ASSESSMENT — ENCOUNTER SYMPTOMS
ABDOMINAL DISTENTION: 0
BLOOD IN STOOL: 0
SHORTNESS OF BREATH: 0
COUGH: 0
NAUSEA: 0
CHEST TIGHTNESS: 0
ABDOMINAL PAIN: 0
PHOTOPHOBIA: 0

## 2021-02-18 NOTE — LETTER
[facility] CARDIOLOGY[address]  Dept: [phone]  Dept Fax: [phone]      2021    Denny Nagel  : 1970  DOS: 2021    To Whom it May Concern:    Venu Renee has been evaluated for cardiac clearance. Based on diagnostic testing including echo, Venu Renee is considered at a low risk for moderate risk surgery. There is no further cardiac testing that could be done to lower the risk. Please let my office know if you have any questions or concerns.           Azael Apple, [address] [facility]

## 2021-02-18 NOTE — PROGRESS NOTES
[address]  21  Referring: Dr. Rubi Moreno CONSULT/CHIEF COMPLAINT/HPI     Reason for visit/ Chief complaint  New patient  Clearance for bariatric surgery   HPI Debbie Trevino is a 45 y.o. seen as a new patient in consult with Dr Charito Carnes. She has a history of asthma. 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 Her brother  of chf and cad at age 47 ( he was using cocaine). Mother  of lung cancer that metastasized to her brain. Father  of cirrhosis and alcoholism. She had gestational diabetes with her first pregnancy, she stopped drinking coke and it resolved. Second pregnancy was unremarkable,both were C Sections. Never had eclampsia, preeclampsia. Previous knee and ankle injury with working out lifting weights and doing bootcamp. She works out on the elliptical 30 minutes at a time, several times a week. The longest she has worked out is 45 minutes. After 45 minutes she feels a little shortness of breath, never associated with, chest  back or jaw pain. If she uses her inhaler, she can walk on the elliptical 1 hour 15 minutes. She states that she can walk up two flights of stairs without stopping. She has no palpitations dizziness or syncope. Has no edema, sleeps on 2 pillows. No chest pain or pressure. No dizziness or lightheadedness. Patient is compliant with medications and is tolerating them well without side effects     HISTORY/ALLERGIES/ROS     MedHx:  has a past medical history of Anxiety, Asthma, Depression, DVT of lower extremity, bilateral ([address] Dysmenorrhea, Insomnia, Migraine headache, Obesity, and Panic attacks. SurgHx:  has a past surgical history that includes  section (1991);  section (1995); Ectopic pregnancy surgery (2002); Tubal ligation (1995); Endometrial ablation (2012);  Knee arthroscopy Heart  Regular rate and rhythm, 1/6 systolic murmur   Head Normocephalic, atraumatic, no abnormalities Abd  Soft, NT, +BS, no mass, no OM   Eyes PERRLA, conj/corn clear Ext  Ext nl, AT, no C/C, no edema   Nose Nares normal, no drain age, Non-tender Pulse 2+ and symmetric   Throat Lips, mucosa, tongue normal Skin Color/text/turg nl, no rash/lesions   Neck S/S, TM, NT, no bruit Psych Nl mood and affect   Lung  CTA-B, unlabored, no DTP     Ch wall NT, no deform       LABS and Imaging     Relevant and available CV data reviewed  Echo/MRI:- Left ventricle: The cavity size is normal. There is mild    concentric hypertrophy. Systolic function was normal. The    calculated ejection fraction was in the range of 59% to 63%. Wall    motion was normal; there were no regional wall motion    abnormalities. Doppler parameters are consistent with abnormal    left ventricular relaxation (grade 1 diastolic dysfunction). The    stroke volume is 62ml. The stroke index is 33ml/m^2. The global    longitudinal strain was -20%. - Mitral valve: There was mild regurgitation.  - Left atrium: The atrium is mildly dilated. - Inferior vena cava: The vessel was normal in size; the    respirophasic diameter changes were in the normal range (&gt;= 50%);    findings are consistent with normal central venous pressure. Cath: none  Holter:none  EKG:sinus rhythm, nonspecific st-t wave changes, personally interpreted  Stress:none  moderate Risk  moderate Complexity/Medical Decision Making  Outside records Reviewed  Labs Reviewed 2/10/2021  tc 261 hdl 71 ldl 176 tri 76 alt 10 ast 10  Prior echo reviewed  Medications reviewed  Old Notes reviewed  Management discussed  ASSESSMENT AND PLAN     1. preop exam for moderate risk surgery  - bariatric surgery   - Dr Yoselyn Zhao  - RCRI of 0  Plan  - May proceed with moderate risk surgery at low risk of cardiovascular complications.  Patient is capable of greater than 4 mets without active cardiac condition (no chest pain, heart failure symptoms, valvular disease, unstable arrhythmia). No further testing is indicated per acc/aha 2014 perioperative guidelines     2. Obesity  - new problem  Plan:  - bariatric surgery with Dr Andrew Hutchins     3. History of asthma  - stable  Plan  - continue with inhalers    4. Mixed Hyperlipidemia  - new problem  Plan  - start atorvastatin 20 mg daily  - repeat Lipid liver in 6 months    5. mitral regurgitation  -new problem  - mild by echo  Plan  - repeat echo in 1 years    Patient counseled on lifestyle modification, diet, and exercise. Follow Up: 1 year    Dr. Shae Morin:  I, Siomara Hutchins, am scribing for and in the presence of Mar Fisher MD.   Consuelo Canchola 02/18/21 10:37 PM   Physician Attestation  The scribe for and in the presence of ravi Celaya DO). The scribe Dieudonne Piedra may have prepopulated components of this note with my historical  intellectual property under my direct supervision. Any additions to this intellectual property were performed in my presence and at my direction.   Furthermore, the content and accuracy of this note have been reviewed by ravi Celaya DO).  2/18/2021 10:45 PM

## 2021-02-18 NOTE — PROGRESS NOTES
Patient reached _X___ yes  _____ no   VM instructions left ____ yes   phone number ________                                ____ no-office notified          Date _2/26/21  ________  Time __0900_____  Arrival __0700  hosp-endo____    Nothing to eat or drink after midnight-follow your doctors prep instructions-this may include taking a second dose of your prep after midnight  Responsible adult 25 or older to stay on site while you are here-drive you home-stay with you after  Follow any instructions your doctors office has given you  Bring a complete list of all your medications and supplements including name,dose,how often taken the day of your procedure  If you normally take the following medications in the morning please do so the AM of your procedure with a small sip of water       Heart,blood pressure,seizure,thyroid or breathing medications-use your inhalers       DO NOT take blood pressure medications ending in \"nadeem\" or \"pril\" the AM of procedure or evening prior  Take half or your normal dose of any long acting insulins the night before your procedure-do not take any diabetic medications the AM of procedure  Follow your doctors instructions regarding stopping or taking  any blood thinners-if you do not have instructions-call them  Any questions call your doctor  Other ______________________________________________________________      COVID TEST     __ done- where ____  _X_ scheduled _2/22/21____ where [facility]___  __ other __________        Lorna Beni POLICY(subject to change)         There is a one visitor policy at [facility] for all surgeries and endoscopies. Whether the visitor can stay or will be asked to wait in the car will depend on the current policy and if social distancing can be maintained. The policy is subject to change at any time. Please make sure the visitor has a cell phone that is on,charged and able to accept calls, as this may be the way that the staff communicates with them. Pain management is NO VISITOR policyThe patients ride is expected to remain in the car with a cell phone for communication. If the ride is leaving the hospital grounds please make sure they are back in time for pickup. Have the patient inform the staff on arrival what their rides plans are while the patient is in the facility. At the MAIN there is one visitor allowed. Please note that the visitor policy is subject to change.

## 2021-02-22 ENCOUNTER — OFFICE VISIT (OUTPATIENT)
Dept: PRIMARY CARE CLINIC | Age: 51
End: 2021-02-22
Payer: COMMERCIAL

## 2021-02-22 DIAGNOSIS — Z20.828 EXPOSURE TO SARS-ASSOCIATED CORONAVIRUS: Primary | ICD-10-CM

## 2021-02-22 LAB — SARS-COV-2: NOT DETECTED

## 2021-02-22 PROCEDURE — 99211 OFF/OP EST MAY X REQ PHY/QHP: CPT | Performed by: NURSE PRACTITIONER

## 2021-02-22 NOTE — PROGRESS NOTES
Ninoska Bishop received a viral test for COVID-19. They were educated on isolation and quarantine as appropriate. For any symptoms, they were directed to seek care from their PCP, given contact information to establish with a doctor, directed to an urgent care or the emergency room.

## 2021-02-22 NOTE — PATIENT INSTRUCTIONS

## 2021-02-25 ENCOUNTER — OFFICE VISIT (OUTPATIENT)
Dept: BARIATRICS/WEIGHT MGMT | Age: 51
End: 2021-02-25
Payer: COMMERCIAL

## 2021-02-25 VITALS
OXYGEN SATURATION: 98 % | TEMPERATURE: 97.7 F | RESPIRATION RATE: 18 BRPM | WEIGHT: 213 LBS | BODY MASS INDEX: 42.94 KG/M2 | DIASTOLIC BLOOD PRESSURE: 79 MMHG | HEIGHT: 59 IN | HEART RATE: 73 BPM | SYSTOLIC BLOOD PRESSURE: 137 MMHG

## 2021-02-25 DIAGNOSIS — K44.9 HIATAL HERNIA: ICD-10-CM

## 2021-02-25 DIAGNOSIS — E78.2 MIXED HYPERLIPIDEMIA: Primary | ICD-10-CM

## 2021-02-25 DIAGNOSIS — K21.9 CHRONIC GERD: ICD-10-CM

## 2021-02-25 DIAGNOSIS — E66.01 MORBID OBESITY WITH BMI OF 40.0-44.9, ADULT (HCC): ICD-10-CM

## 2021-02-25 PROCEDURE — 99213 OFFICE O/P EST LOW 20 MIN: CPT | Performed by: SURGERY

## 2021-02-25 NOTE — PROGRESS NOTES
Loree Piña lost 3.6 lbs over past 5 weeks. Pt is lactose intolerant - milk only. Breakfast: coffee with turkey sausage    Snack: cutie     Lunch: chicken breast with broccoli    Snack: cashew     Dinner: salmon and broccoli     Snack: nothing     Is pt consuming smaller portions? Yes     Is pt consuming at least 64 oz of fluids per day? Yes    Is pt consuming carbonated, caffeinated, or sugary beverages? Yes - Water, decreasing soda, lactaid milk, decaf coffee with cream    Has pt sampled Unjury and/or Nectar protein?  Not yet, discussed today - unjury samples given    Exercise: Walking for 30 minutes when able + walking her dog, limited with knee     Plan/Recommendations:   Include protein with fruit   Eliminate soda   Try protein powder  Increase fluid intake to 64 oz/day   Continue walking     Handouts: portion control, presurgical diet eating guide     Shelley Noriega

## 2021-02-26 ENCOUNTER — ANESTHESIA (OUTPATIENT)
Dept: ENDOSCOPY | Age: 51
End: 2021-02-26
Payer: COMMERCIAL

## 2021-02-26 ENCOUNTER — ANESTHESIA EVENT (OUTPATIENT)
Dept: ENDOSCOPY | Age: 51
End: 2021-02-26
Payer: COMMERCIAL

## 2021-02-26 ENCOUNTER — HOSPITAL ENCOUNTER (OUTPATIENT)
Age: 51
Setting detail: OUTPATIENT SURGERY
Discharge: HOME | End: 2021-02-26
Attending: SURGERY | Admitting: SURGERY
Payer: COMMERCIAL

## 2021-02-26 VITALS
HEART RATE: 99 BPM | BODY MASS INDEX: 42.01 KG/M2 | DIASTOLIC BLOOD PRESSURE: 95 MMHG | TEMPERATURE: 97.6 F | SYSTOLIC BLOOD PRESSURE: 133 MMHG | OXYGEN SATURATION: 98 % | RESPIRATION RATE: 16 BRPM | WEIGHT: 208.4 LBS | HEIGHT: 59 IN

## 2021-02-26 VITALS
DIASTOLIC BLOOD PRESSURE: 67 MMHG | RESPIRATION RATE: 20 BRPM | SYSTOLIC BLOOD PRESSURE: 118 MMHG | OXYGEN SATURATION: 100 %

## 2021-02-26 PROBLEM — K44.9 HIATAL HERNIA: Status: ACTIVE | Noted: 2021-02-26

## 2021-02-26 LAB — HCG(URINE) PREGNANCY TEST: NEGATIVE

## 2021-02-26 PROCEDURE — 84703 CHORIONIC GONADOTROPIN ASSAY: CPT

## 2021-02-26 PROCEDURE — 7100000010 HC PHASE II RECOVERY - FIRST 15 MIN: Performed by: SURGERY

## 2021-02-26 PROCEDURE — 3700000000 HC ANESTHESIA ATTENDED CARE: Performed by: SURGERY

## 2021-02-26 PROCEDURE — 88305 TISSUE EXAM BY PATHOLOGIST: CPT

## 2021-02-26 PROCEDURE — 7100000000 HC PACU RECOVERY - FIRST 15 MIN: Performed by: SURGERY

## 2021-02-26 PROCEDURE — 43239 EGD BIOPSY SINGLE/MULTIPLE: CPT | Performed by: SURGERY

## 2021-02-26 PROCEDURE — 2709999900 HC NON-CHARGEABLE SUPPLY: Performed by: SURGERY

## 2021-02-26 PROCEDURE — 2580000003 HC RX 258: Performed by: SURGERY

## 2021-02-26 PROCEDURE — 2500000003 HC RX 250 WO HCPCS: Performed by: NURSE ANESTHETIST, CERTIFIED REGISTERED

## 2021-02-26 PROCEDURE — 3609012400 HC EGD TRANSORAL BIOPSY SINGLE/MULTIPLE: Performed by: SURGERY

## 2021-02-26 PROCEDURE — 6360000002 HC RX W HCPCS: Performed by: NURSE ANESTHETIST, CERTIFIED REGISTERED

## 2021-02-26 RX ORDER — GLYCOPYRROLATE 0.2 MG/ML
INJECTION INTRAMUSCULAR; INTRAVENOUS PRN
Status: DISCONTINUED | OUTPATIENT
Start: 2021-02-26 | End: 2021-02-26 | Stop reason: SDUPTHER

## 2021-02-26 RX ORDER — FAMOTIDINE 20 MG/1
20 TABLET, FILM COATED ORAL DAILY
Qty: 60 TABLET | Refills: 3 | Status: SHIPPED | OUTPATIENT
Start: 2021-02-26 | End: 2021-11-22 | Stop reason: ALTCHOICE

## 2021-02-26 RX ORDER — PROPOFOL 10 MG/ML
INJECTION, EMULSION INTRAVENOUS PRN
Status: DISCONTINUED | OUTPATIENT
Start: 2021-02-26 | End: 2021-02-26 | Stop reason: SDUPTHER

## 2021-02-26 RX ORDER — SODIUM CHLORIDE 9 MG/ML
INJECTION, SOLUTION INTRAVENOUS CONTINUOUS
Status: DISCONTINUED | OUTPATIENT
Start: 2021-02-26 | End: 2021-02-26 | Stop reason: HOSPADM

## 2021-02-26 RX ORDER — LIDOCAINE HYDROCHLORIDE 20 MG/ML
INJECTION, SOLUTION INFILTRATION; PERINEURAL PRN
Status: DISCONTINUED | OUTPATIENT
Start: 2021-02-26 | End: 2021-02-26 | Stop reason: SDUPTHER

## 2021-02-26 RX ADMIN — SODIUM CHLORIDE: 9 INJECTION, SOLUTION INTRAVENOUS at 07:20

## 2021-02-26 RX ADMIN — PROPOFOL 50 MG: 10 INJECTION, EMULSION INTRAVENOUS at 09:06

## 2021-02-26 RX ADMIN — PROPOFOL 150 MG: 10 INJECTION, EMULSION INTRAVENOUS at 09:02

## 2021-02-26 RX ADMIN — LIDOCAINE HYDROCHLORIDE 100 MG: 20 INJECTION, SOLUTION INFILTRATION; PERINEURAL at 09:02

## 2021-02-26 RX ADMIN — PROPOFOL 50 MG: 10 INJECTION, EMULSION INTRAVENOUS at 09:04

## 2021-02-26 RX ADMIN — GLYCOPYRROLATE 0.2 MG: 0.2 INJECTION INTRAMUSCULAR; INTRAVENOUS at 09:00

## 2021-02-26 ASSESSMENT — PULMONARY FUNCTION TESTS
PIF_VALUE: 1

## 2021-02-26 ASSESSMENT — ENCOUNTER SYMPTOMS: SHORTNESS OF BREATH: 0

## 2021-02-26 NOTE — PROGRESS NOTES
Pt arrived from endo, report from crna/rn. Pt had EGD with biopsy, respirations even unlabored, abdomen soft non distended.  Pt responsive to voice upon arrival.

## 2021-02-26 NOTE — ANESTHESIA PRE PROCEDURE
Date    Anxiety 2012    Asthma     Depression 2013    DVT of lower extremity, bilateral ([address]  and     while pregnant    Dysmenorrhea 10/14/2011    Insomnia 2013    Migraine headache 11/10/2010    Obesity 2010    Panic attacks 10/17/2012       Past Surgical History:        Procedure Laterality Date     SECTION  1991     SECTION  1995   [place] [facility] ECTOPIC PREGNANCY SURGERY  2002    right salpingo-oopherectomy    ENDOMETRIAL ABLATION  2012    KNEE ARTHROSCOPY Right     Dr. Yodit Byrne [address] [facility]  1995       Social History:    Social History     Tobacco Use    Smoking status: Never Smoker    Smokeless tobacco: Never Used   Substance Use Topics    Alcohol use:  Yes     Alcohol/week: 1.0 standard drinks     Types: 1 Glasses of wine per week     Frequency: 2-3 times a week     Drinks per session: 1 or 2     Binge frequency: Weekly     Comment: occasional                                Counseling given: Not Answered      Vital Signs (Current):   Vitals:    21 1528 21 0653 21 0719   BP:  122/76    Pulse:  71    Resp:  16    SpO2:  100%    Weight: 210 lb (95.3 kg) 210 lb (95.3 kg) 208 lb 6.4 oz (94.5 kg)   Height: 4' 11\" (1.499 m)                                                BP Readings from Last 3 Encounters:   21 122/76   21 137/79   21 120/80       NPO Status: Time of last liquid consumption: 1800                        Time of last solid consumption:                         Date of last liquid consumption: 21                        Date of last solid food consumption: 21    BMI:   Wt Readings from Last 3 Encounters:   21 208 lb 6.4 oz (94.5 kg)   21 213 lb (96.6 kg)   21 210 lb (95.3 kg) Body mass index is 42.09 kg/m². CBC:   Lab Results   Component Value Date    WBC 4.6 02/10/2021    RBC 4.24 02/10/2021    HGB 11.6 02/10/2021    HCT 36.8 02/10/2021    MCV 86.9 02/10/2021    RDW 14.7 02/10/2021     02/10/2021       CMP:   Lab Results   Component Value Date     02/10/2021    K 4.0 02/10/2021     02/10/2021    CO2 25 02/10/2021    BUN 9 02/10/2021    CREATININE 0.9 02/10/2021    GFRAA >60 02/10/2021    GFRAA >60 08/15/2012    AGRATIO 1.3 02/10/2021    LABGLOM >60 02/10/2021    GLUCOSE 82 02/10/2021    PROT 7.9 02/10/2021    PROT 7.4 08/15/2012    CALCIUM 10.0 02/10/2021    BILITOT <0.2 02/10/2021    ALKPHOS 77 02/10/2021    AST 17 02/10/2021    ALT 10 02/10/2021       POC Tests: No results for input(s): POCGLU, POCNA, POCK, POCCL, POCBUN, POCHEMO, POCHCT in the last 72 hours.     Coags:   Lab Results   Component Value Date    PROTIME 11.9 02/10/2021    INR 1.03 02/10/2021       HCG (If Applicable):   Lab Results   Component Value Date    PREGTESTUR Negative 02/26/2021    HCG NEGATIVE 07/07/2011        ABGs: No results found for: PHART, PO2ART, MUA9ZAY, ZHZ6CIZ, BEART, J0CZZLCZ     Type & Screen (If Applicable):  No results found for: LABABO, LABRH    Drug/Infectious Status (If Applicable):  No results found for: HIV, HEPCAB    COVID-19 Screening (If Applicable):   Lab Results   Component Value Date    COVID19 Not Detected 02/22/2021    COVID19 DETECTED 10/06/2020         Anesthesia Evaluation  Patient summary reviewed and Nursing notes reviewed no history of anesthetic complications:   Airway: Mallampati: II  TM distance: >3 FB   Neck ROM: full  Mouth opening: > = 3 FB Dental: normal exam         Pulmonary:   (+) asthma:     (-) shortness of breath                           Cardiovascular:        (-) hypertension and  angina                Neuro/Psych:   (+) headaches: migraine headaches, depression/anxiety    (-) CVA           GI/Hepatic/Renal:   (+) GERD:, morbid obesity     (-)

## 2021-02-26 NOTE — H&P
Department of General Surgery - Adult Surgical Service   [facility]) Physicians   [employer]  Attending Pre-operative History and Physical      DIAGNOSIS:  Obesity    INDICATION:  Pre-op    PROCEDURE:  EGD    CHIEF COMPLAINT:  Obesity    History Obtained From:  patient    HISTORY OF PRESENT ILLNESS:    The patient is a 45 y.o. female with significant past medical history of   Patient Active Problem List   Diagnosis    Obesity    Asthma    Migraine headache    Dysmenorrhea    Right knee pain    Back pain    Constipation    Anxiety    Depression    Insomnia    Need for Tdap vaccination    Morbid obesity with BMI of 40.0-44.9, adult ([address]    Mixed hyperlipidemia    Chronic GERD    Chronic midline low back pain without sciatica      who presents for pre-op EGD    Past Medical History:        Diagnosis Date    Anxiety 2012    Asthma     Depression 2013    DVT of lower extremity, bilateral ([address]  and     while pregnant    Dysmenorrhea 10/14/2011    Insomnia 2013    Migraine headache 11/10/2010    Obesity 2010    Panic attacks 10/17/2012     Past Surgical History:        Procedure Laterality Date     SECTION  1991     SECTION  1995   [facility] ECTOPIC [facility]REGNANC[facility] SURGERY  2002    right salpingo-oopherectomy    ENDOMETRIAL ABLATION  2012    KNEE ARTHROSCOPY Right     Dr. Tri [address] [facility]  1995     Medications Prior to Admission:   Medications Prior to Admission: albuterol (PROVENTIL HFA) 108 (90 BASE) MCG/ACT inhaler, Inhale 2 puffs into the lungs every 4 hours as needed for Wheezing or Shortness of Breath.   atorvastatin (LIPITOR) 20 MG tablet, Take 1 tablet by mouth daily  amitriptyline (ELAVIL) 25 MG tablet, Take 25 mg by mouth nightly    Allergies:  Amoxicillin, Penicillins, and Red dye    Social History:   TOBACCO:   reports that she has never smoked. She has never used smokeless tobacco.  ETOH:   reports current alcohol use of about 1.0 standard drinks of alcohol per week. Family History:       Problem Relation Age of Onset    High Blood Pressure Brother     Substance Abuse Brother         alcohol    Diabetes Brother     Hypertension Brother     High Blood Pressure Brother     Substance Abuse Brother         alcohol    Diabetes Brother     Hypertension Brother     Substance Abuse Mother         smoker    Cancer Mother         lung cancer    Substance Abuse Father         alcohol    Cirrhosis Father     Other Sister         uterine fibroids    Other Sister         fibrocystic breast disease    Diabetes Sister     High Blood Pressure Sister     Hypertension Sister     Asthma Son          REVIEW OF SYSTEMS:    Review of Systems - History obtained from the patient  General ROS: negative  Psychological ROS: negative  Ophthalmic ROS: negative  Neurological ROS: negative  ENT ROS: negative  Allergy and Immunology ROS: negative  Hematological and Lymphatic ROS: negative  Endocrine ROS: negative  Breast ROS: negative  Respiratory ROS: negative  Cardiovascular ROS: negative  Gastrointestinal ROS:negative  Genito-Urinary ROS: negative  Musculoskeletal ROS: negative   Skin ROS: negative      PHYSICAL EXAM:      /76   Pulse 71   Resp 16   Ht 4' 11\" (1.499 m)   Wt 208 lb 6.4 oz (94.5 kg)   LMP  (LMP Unknown)   SpO2 100%   BMI 42.09 kg/m²  I      Physical Exam   Vitals Reviewed   Constitutional: Patient is oriented to person, place, and time. Patient appears well-developed and well-nourished. Patient is active and cooperative. Non-toxic appearance. No distress. HENT:   Head: Normocephalic and atraumatic. Head is without abrasion and without laceration. Hair is normal.   Right Ear: External ear normal. No lacerations. No drainage, swelling . Left Ear: External ear normal. No lacerations. No drainage, swelling. Nose: Nose normal. No nose lacerations or nasal deformity. Eyes: Conjunctivae, EOM and lids are normal. Right eye exhibits no discharge. No foreign body present in the right eye. Left eye exhibits no discharge. No foreign body present in the left eye. No scleral icterus. Neck: Trachea normal and normal range of motion. No JVD present. Pulmonary/Chest: Effort normal. No accessory muscle usage or stridor. No apnea. No respiratory distress. Cardiovascular: Normal rate and no JVD. Abdominal: Normal appearance. Patient exhibits no distension. Musculoskeletal: Normal range of motion. Patient exhibits no edema. Neurological: Patient is alert and oriented to person, place, and time. Patient has normal strength. GCS eye subscore is 4. GCS verbal subscore is 5. GCS motor subscore is 6. Skin: Skin is warm and dry. No abrasion and no rash noted. Patient is not diaphoretic. No cyanosis or erythema. Psychiatric: Patient has a normal mood and affect.  Speech is normal and behavior is normal. Cognition and memory are normal.       DATA:  CBC:   Lab Results   Component Value Date    WBC 4.6 02/10/2021    RBC 4.24 02/10/2021    HGB 11.6 02/10/2021    HCT 36.8 02/10/2021    MCV 86.9 02/10/2021    MCH 27.5 02/10/2021    MCHC 31.6 02/10/2021    RDW 14.7 02/10/2021     02/10/2021    MPV 8.1 02/10/2021     CMP:    Lab Results   Component Value Date     02/10/2021    K 4.0 02/10/2021     02/10/2021    CO2 25 02/10/2021    BUN 9 02/10/2021    CREATININE 0.9 02/10/2021    GFRAA >60 02/10/2021    GFRAA >60 08/15/2012    AGRATIO 1.3 02/10/2021    LABGLOM >60 02/10/2021    GLUCOSE 82 02/10/2021    PROT 7.9 02/10/2021    PROT 7.4 08/15/2012    LABALBU 4.5 02/10/2021    CALCIUM 10.0 02/10/2021    BILITOT <0.2 02/10/2021    ALKPHOS 77 02/10/2021    AST 17 02/10/2021    ALT 10 02/10/2021       ASSESSMENT AND PLAN:    1. Patient is a 45 y.o. female with above specified procedure planned EGD with deep sedation  2. Procedure options, risks and benefits reviewed with patient. Patient expresses understanding.

## 2021-02-26 NOTE — PROGRESS NOTES
Discharge instructions reviewed with pt, all questions answered and verbalized understanding. Pt awake and alert.

## 2021-02-26 NOTE — PROGRESS NOTES
Reviewed patient's medical and surgical history in electronic record and with patient at the bedside. All questions regarding procedure answered. Scope number and equipment verified using a two person system. Family in waiting room.     Electronically signed by Leidy Lay RN on 2/26/2021 at 9:00 AM

## 2021-02-28 NOTE — PROGRESS NOTES
[facility]) Physicians   [employer]  Selin Franco MD, [address] [facility], [address] [place] [address]    [address] [id number] . Phone: [phone]  Fax: [phone]          Chief Complaint   Patient presents with    Obesity     2nd pre-surg         HPI:     Rachele Jon is a very pleasant 45 y.o. female with Body mass index is 43.02 kg/m². / Chronic Obesity. Dylan Griffin has been struggling for several years now with obesity. Dylan Griffin feels the weight is an obstacle to achieve and perform things in daily living as well risk on health. Patient  is very determined to lose weight and be healthy, and is working towards  surgical weight loss to achieve this goal. Pre-operative clearance and work up pending. Working hard to keep good dietary habits as well level of activity. Patient denies any nausea, vomiting, fevers, chills, shortness of breath, chest pain, cough, constipation or difficulty urinating.     Pain Assessment   Denies any abdominal pain       Past Medical History:   Diagnosis Date    Anxiety 2012    Asthma     Depression 2013    DVT of lower extremity, bilateral ([address]  and     while pregnant    Dysmenorrhea 10/14/2011    Insomnia 2013    Migraine headache 11/10/2010    Obesity 2010    Panic attacks 10/17/2012     Past Surgical History:   Procedure Laterality Date     SECTION  1991     SECTION  1995   [facility] ECTOPIC PREGNANCY SURGERY  2002    right salpingo-oopherectomy    ENDOMETRIAL ABLATION  2012    KNEE ARTHROSCOPY Right     Dr. Stanislav Meadows Dr[address] [facility]  1995    UPPER GASTROINTESTINAL ENDOSCOPY N/A 2021    EGD BIOPSY performed by Flakita Ricardo MD at [address] [facility] ENDOSCOPY     Family History   Problem Relation Age of Onset    High Blood Pressure Brother     Substance Abuse Brother         alcohol    Diabetes Brother     Hypertension Brother     High Blood Pressure Brother     Substance Abuse Brother         alcohol    Diabetes Brother     Hypertension Brother     Substance Abuse Mother         smoker    Cancer Mother         lung cancer    Substance Abuse Father         alcohol    Cirrhosis Father     Other Sister         uterine fibroids    Other Sister         fibrocystic breast disease    Diabetes Sister     High Blood Pressure Sister     Hypertension Sister     Asthma Son      Social History     Tobacco Use    Smoking status: Never Smoker    Smokeless tobacco: Never Used   Substance Use Topics    Alcohol use: Yes     Alcohol/week: 1.0 standard drinks     Types: 1 Glasses of wine per week     Frequency: 2-3 times a week     Drinks per session: 1 or 2     Binge frequency: Weekly     Comment: occasional     I counseled the patient on the importance of not smoking and risks of ETOH. Allergies   Allergen Reactions    Amoxicillin Hives, Shortness Of Breath, Itching and Swelling     tongue swelling    Penicillins Hives, Shortness Of Breath, Itching and Swelling    Red Dye Hives, Itching and Swelling     The patient had a skin reaction to a red hair dye NOT oral medicines      Vitals:    02/25/21 1232   BP: 137/79   Pulse: 73   Resp: 18   Temp: 97.7 °F (36.5 °C)   SpO2: 98%   Weight: 213 lb (96.6 kg)   Height: 4' 11\" (1.499 m)       Body mass index is 43.02 kg/m².     Lab Results   Component Value Date    WBC 4.6 02/10/2021    RBC 4.24 02/10/2021    HGB 11.6 02/10/2021    HCT 36.8 02/10/2021    MCV 86.9 02/10/2021    MCH 27.5 02/10/2021    MCHC 31.6 02/10/2021    MPV 8.1 02/10/2021    NEUTOPHILPCT 50.7 02/10/2021    LYMPHOPCT 41.3 02/10/2021    MONOPCT 4.7 02/10/2021    EOSRELPCT 2.7 02/10/2021    BASOPCT 0.6 02/10/2021    NEUTROABS 2.3 02/10/2021    LYMPHSABS 1.9 02/10/2021    MONOSABS 0.2 02/10/2021 negative  Respiratory ROS: negative  Cardiovascular ROS: negative  Gastrointestinal ROS:negative  Genito-Urinary ROS: negative  Musculoskeletal ROS: negative   Skin ROS: negative    Physical Exam   Vitals Reviewed   Constitutional: Patient is oriented to person, place, and time. Patient appears well-developed and well-nourished. Patient is active and cooperative. Non-toxic appearance. No distress. HENT:   Head: Normocephalic and atraumatic. Head is without abrasion and without laceration. Hair is normal.   Right Ear: External ear normal. No lacerations. No drainage, swelling . Left Ear: External ear normal. No lacerations. No drainage, swelling. Nose: face mask in place  Eyes: Conjunctivae, EOM and lids are normal. Right eye exhibits no discharge. No foreign body present in the right eye. Left eye exhibits no discharge. No foreign body present in the left eye. No scleral icterus. Neck: Trachea normal and normal range of motion. No JVD present. Pulmonary/Chest: Effort normal. No accessory muscle usage or stridor. No apnea. No respiratory distress. Cardiovascular: Normal rate and no JVD. Abdominal: Normal appearance. Patient exhibits no distension. Abdomen is soft, obese, non tender. Musculoskeletal: Normal range of motion. Patient exhibits no edema. Neurological: Patient is alert and oriented to person, place, and time. Patient has normal strength. GCS eye subscore is 4. GCS verbal subscore is 5. GCS motor subscore is 6. Skin: Skin is warm and dry. No abrasion and no rash noted. Patient is not diaphoretic. No cyanosis or erythema. Psychiatric: Patient has a normal mood and affect. Speech is normal and behavior is normal. Cognition and memory are normal.       A/P    Jose Brar is 45 y.o. female, Body mass index is 43.02 kg/m². pre surgery, has lost 3.6 since last visit. The patient underwent dietary counseling with registered dietician.   I have reviewed, discussed and agree with the dietary plan. Patient is trying hard to keep good dietary and behavior modifications. Patient is monitoring portion sizes, food choices and liquid calories. Patient is trying to exercise regularly as much as possible. Obesity as a disease is considered a high risk to patients overall health and should therefore be considered a high risk disease state. Now with Covid-19 pandemic, CDC and health authorities does classify obese patients as vulnerable and high risk as well. Which makes weight loss a priority for improvement of their wellbeing and overall health. We discussed how her weight affects her overall health including:  Patient Active Problem List   Diagnosis    Obesity    Asthma    Migraine headache    Dysmenorrhea    Right knee pain    Back pain    Constipation    Anxiety    Depression    Insomnia    Need for Tdap vaccination    Morbid obesity with BMI of 40.0-44.9, adult (HCC)    Mixed hyperlipidemia    Chronic GERD    Chronic midline low back pain without sciatica    Hiatal hernia    and importance of weight loss to alleviate those co morbid conditions. I encouraged the patient to continue exercise and keeping healthy eating habits. Discussed pre-op labs and work up till now. Also counseled the patient extensively on Surgery. Total encounter time: 20 minutes, including any number of the following: Bariatric Pre/Post operative work up/protocols, review of labs, imaging, provider notes, outside hospital records, performing examination/evaluation, counseling patient and/or family, ordering medications/tests, placing referrals and communication with referring physicians, coordination of care; discussing dietary plan/recall with the patient as well with registered dietitian and documentation in the EHR. Of note, the above was done during same day of the actual patient encounter.            Chuy Salguero is here for her second presurgical visit patient is already starting to make good dietary choices. Patient is working on her presurgical requirements. We will see the patient next month for continued follow-up. RTC in 4 weeks  Obtain rest of pre-op work up / clearances  Healthy Diet and Exercise      Patient advised that its their responsibility to follow up for studies, referrals and/or labs ordered today.

## 2021-03-03 ENCOUNTER — TELEPHONE (OUTPATIENT)
Dept: BARIATRICS/WEIGHT MGMT | Age: 51
End: 2021-03-03

## 2021-03-03 NOTE — TELEPHONE ENCOUNTER
Called patient back and reviewed EGD results with her, results are unremarkable. Patient verbalized understanding.

## 2021-03-03 NOTE — TELEPHONE ENCOUNTER
Patient is currently pre-surgical.    She calls the office requesting results to EGD biopsy completed 2/25/21. Patient did give verbal consent to leave detailed voicemail if unable to reach. Please call patient to discuss.      # [phone]

## 2021-03-09 ENCOUNTER — OFFICE VISIT (OUTPATIENT)
Dept: BARIATRICS/WEIGHT MGMT | Age: 51
End: 2021-03-09
Payer: COMMERCIAL

## 2021-03-09 VITALS
HEART RATE: 70 BPM | HEIGHT: 59 IN | WEIGHT: 213 LBS | RESPIRATION RATE: 16 BRPM | SYSTOLIC BLOOD PRESSURE: 136 MMHG | DIASTOLIC BLOOD PRESSURE: 84 MMHG | BODY MASS INDEX: 42.94 KG/M2 | TEMPERATURE: 97.8 F

## 2021-03-09 DIAGNOSIS — E66.01 MORBID OBESITY WITH BMI OF 40.0-44.9, ADULT (HCC): ICD-10-CM

## 2021-03-09 DIAGNOSIS — M54.50 CHRONIC MIDLINE LOW BACK PAIN WITHOUT SCIATICA: ICD-10-CM

## 2021-03-09 DIAGNOSIS — K21.9 CHRONIC GERD: Primary | ICD-10-CM

## 2021-03-09 DIAGNOSIS — K44.9 HIATAL HERNIA: ICD-10-CM

## 2021-03-09 DIAGNOSIS — G89.29 CHRONIC MIDLINE LOW BACK PAIN WITHOUT SCIATICA: ICD-10-CM

## 2021-03-09 PROCEDURE — 99214 OFFICE O/P EST MOD 30 MIN: CPT | Performed by: SURGERY

## 2021-03-09 NOTE — PROGRESS NOTES
Loree SEVERINO Piña stable lbs over 1 month. Pt reports increasing vegetable intake, increasing fluids, and walking more. Pt reports no BM for a week and a half, encouraged fluids and movement - reviewed Colace and Miralax as options. Is pt eating at least 4 times everyday? Eating 3-4x day   B - turkey hedrick with eggs  S - 2 HB eggs  L - baked chix with broccoli  S - sometimes - cashews  D - baked salmon with vinaigrette and veggie medley    Is pt eating a lean protein source with all meals and snacks? Yes - chix, salmon, shrimp, HB eggs    Has pt decreased their portions using the plate method? Yes, being mindful of portion size, using a smaller plate    Is pt choosing low fat/sugar free options? Yes, trying to avoid    Is pt drinking at least 64 oz of clear liquids everyday? Yes, water, [place]    Has pt stopped drinking carbonation, caffeinated, and sugar sweetened beverages? some cream in decaf coffee    Has pt sampled Unjury and/or Nectar protein? Tried and tolerated, needs to try additional flavors.     Participating in intentional exercise? walking 3x week for 45-60min     Plan/Recommendations:  - Consistently eat 4x day to include protein with each meal / snack  - Try additional protein powders  - Continue walking, increasing frequency and duration as tolerated d/t knee    Handouts: none    Pippa Morrow

## 2021-03-09 NOTE — PROGRESS NOTES
[facility] ([facility]) Physicians   [employer]  Chastity Fernandez MD, [address] [facility], [address] [place] [address]    [place] [address] . Phone: [phone]  Fax: [phone]        HPI:     Misbah Milligan is a very pleasant 45 y.o. female with Body mass index is 43.02 kg/m². , Pre-Surgery for future weight loss. Pre-operative clearance and work up done. Working hard to keep good dietary habits as well level of activity. Patient denies any nausea, vomiting, fevers, chills, shortness of breath, chest pain, cough, constipation or difficulty urinating.     Pain Assessment   Denies any abdominal pain       Past Medical History:   Diagnosis Date    Anxiety 2012    Asthma     Depression 2013    DVT of lower extremity, bilateral ([address]  and     while pregnant    Dysmenorrhea 10/14/2011    Insomnia 2013    Migraine headache 11/10/2010    Obesity 2010    Panic attacks 10/17/2012     Past Surgical History:   Procedure Laterality Date     SECTION  1991     SECTION  1995   [facility] ECTOPIC PREGNANCY SURGERY  2002    right salpingo-oopherectomy    ENDOMETRIAL ABLATION  2012    KNEE ARTHROSCOPY Right     Dr. Cat [address] [facility]  1995    UPPER GASTROINTESTINAL ENDOSCOPY N/A 2021    EGD BIOPSY performed by Sinan Bowman MD at [address]     Family History   Problem Relation Age of Onset    High Blood Pressure Brother     Substance Abuse Brother         alcohol    Diabetes Brother     Hypertension Brother     High Blood Pressure Brother     Substance Abuse Brother         alcohol    Diabetes Brother     Hypertension Brother     Substance Abuse Mother         smoker    Cancer Mother         lung cancer    Substance Abuse Father alcohol    Cirrhosis Father     Other Sister         uterine fibroids    Other Sister         fibrocystic breast disease    Diabetes Sister     High Blood Pressure Sister     Hypertension Sister     Asthma Son      Social History     Tobacco Use    Smoking status: Never Smoker    Smokeless tobacco: Never Used   Substance Use Topics    Alcohol use: Yes     Alcohol/week: 1.0 standard drinks     Types: 1 Glasses of wine per week     Frequency: 2-3 times a week     Drinks per session: 1 or 2     Binge frequency: Weekly     Comment: occasional     I counseled the patient on the importance of not smoking and risks of ETOH. Allergies   Allergen Reactions    Amoxicillin Hives, Shortness Of Breath, Itching and Swelling     tongue swelling    Penicillins Hives, Shortness Of Breath, Itching and Swelling    Red Dye Hives, Itching and Swelling     The patient had a skin reaction to a red hair dye NOT oral medicines      Vitals:    03/09/21 1206   BP: 136/84   Pulse: 70   Resp: 16   Temp: 97.8 °F (36.6 °C)   TempSrc: Temporal   Weight: 213 lb (96.6 kg)   Height: 4' 11\" (1.499 m)       Body mass index is 43.02 kg/m².       Current Outpatient Medications:     famotidine (PEPCID) 20 MG tablet, Take 1 tablet by mouth daily, Disp: 60 tablet, Rfl: 3    atorvastatin (LIPITOR) 20 MG tablet, Take 1 tablet by mouth daily, Disp: 90 tablet, Rfl: 3    amitriptyline (ELAVIL) 25 MG tablet, Take 25 mg by mouth nightly, Disp: , Rfl:     albuterol (PROVENTIL HFA) 108 (90 BASE) MCG/ACT inhaler, Inhale 2 puffs into the lungs every 4 hours as needed for Wheezing or Shortness of Breath., Disp: 1 Inhaler, Rfl: 12      Review of Systems - History obtained from the patient  General ROS: negative  Psychological ROS: negative  Ophthalmic ROS: negative  Neurological ROS: negative  ENT ROS: negative  Allergy and Immunology ROS: negative  Hematological and Lymphatic ROS: negative  Endocrine ROS: negative  Breast ROS: negative  Respiratory ROS: negative  Cardiovascular ROS: negative  Gastrointestinal ROS:negative  Genito-Urinary ROS: negative  Musculoskeletal ROS: negative   Skin ROS: negative    Physical Exam   Vitals Reviewed   Constitutional: Patient is oriented to person, place, and time. Patient appears well-developed and well-nourished. Patient is active and cooperative. Non-toxic appearance. No distress. HENT:   Head: Normocephalic and atraumatic. Head is without abrasion and without laceration. Hair is normal.   Right Ear: External ear normal. No lacerations. No drainage, swelling . Left Ear: External ear normal. No lacerations. No drainage, swelling. Nose: Nose normal. No nose lacerations or nasal deformity. Eyes: Conjunctivae, EOM and lids are normal. Right eye exhibits no discharge. No foreign body present in the right eye. Left eye exhibits no discharge. No foreign body present in the left eye. No scleral icterus. Neck: Trachea normal and normal range of motion. No JVD present. Pulmonary/Chest: Effort normal. No accessory muscle usage or stridor. No apnea. No respiratory distress. Cardiovascular: Normal rate and no JVD. Abdominal: Normal appearance. Patient exhibits no distension. Abdomen is soft, obese, non tender. Musculoskeletal: Normal range of motion. Patient exhibits no edema. Neurological: Patient is alert and oriented to person, place, and time. Patient has normal strength. GCS eye subscore is 4. GCS verbal subscore is 5. GCS motor subscore is 6. Skin: Skin is warm and dry. No abrasion and no rash noted. Patient is not diaphoretic. No cyanosis or erythema. Psychiatric: Patient has a normal mood and affect. Speech is normal and behavior is normal. Cognition and memory are normal.       A/P       Susy Dancer is 45 y.o. female, Body mass index is 43.02 kg/m². pre surgery. The patient underwent dietary counseling with registered dietician. I have reviewed, discussed and agree with the dietary plan. Patient is trying hard to keep good dietary and behavior modifications. Patient is monitoring portion sizes, food choices and liquid calories. Patient is trying to exercise regularly as much as possible. We discussed how her weight affects her overall health including:  Patient Active Problem List   Diagnosis    Obesity    Asthma    Migraine headache    Dysmenorrhea    Right knee pain    Back pain    Constipation    Anxiety    Depression    Insomnia    Need for Tdap vaccination    Morbid obesity with BMI of 40.0-44.9, adult (HCC)    Mixed hyperlipidemia    Chronic GERD    Chronic midline low back pain without sciatica    Hiatal hernia    and importance of weight loss to alleviate those co morbid conditions. I encouraged the patient to continue exercise and keeping healthy eating habits. Discussed pre-op labs and work up till now. Also counseled the patient extensively on Surgery. Total encounter time: 30 minutes, including any number of the following: Bariatric Pre/Post operative work up/protocols, review of labs, imaging, provider notes, outside hospital records, performing examination/evaluation, counseling patient and/or family, ordering medications/tests, placing referrals and communication with referring physicians, coordination of care; discussing dietary plan/recall with the patient as well with registered dietitian and documentation in the EHR. Of note, the above was done during same day of the actual patient encounter. Darell Clements is a 45 y.o. female with Body mass index is 43.02 kg/m². ,  patient is deemed appropriate candidate for weight loss surgery by our multidisciplinary team.       Following The Metabolic and Bariatric Surgery Accreditation and Quality Improvement Program [facility]), and [employer] (ACS) recommendations, the Bariatric Surgical Risk/Benefit Calculator was used for Darell Clements.   Report was discussed with Elia and patient wishes to proceed with surgery, fully understanding the risks and benefits. Of note, The [facility] Bariatric Surgical Risk/Benefit Calculator estimates the chance of an unfavorable outcome (such as a complication or death), the chance of remission of weight-related comorbidities, and the patient's BMI, weight change, and percent total weight change after surgery. These quantities are estimated based upon information the patient gives the healthcare provider about prior health history. The estimates are calculated using data from a large number of patients who had a primary bariatric surgical procedure similar to the one the patient may have. Please note the risk percentages, remission percentages, BMI, weight change, and percent total weight change provided to you by the risk/benefit calculator are only estimates. These estimates only take certain information into account. There may be other factors that are not included in the estimate which may increase or decrease the risk of a complication, the chance of remission of a weight-related comorbidity, or the amount of weight the patient loses. These estimates are not a guarantee of results. A complication after surgery may happen even if the risk is low, a weight-related comorbidity may not go into remission even if the chances are high, and a patient may lose more or less weight than predicted. This information is not intended to replace the advice of a doctor or healthcare provider about the diagnosis, treatment, or potential outcomes. The Provider is not responsible for medical decisions that may be made by the patient based on the risk/benefit calculator estimates, since these estimates are provided for informational purposes. Patients should always consult their doctor or other health care provider before deciding on a treatment plan. Both open and laparoscopic approach were explained in details. Benefits and risks explained.  Informed procedures, patient have been given the opportunity to postpone   surgery until a  later date. Mejia Weaver have chosen to proceed with surgery knowing the risks associated with COVID-19. Laz Collins was satisfied with the discussion we had and Lza Collins had no further questions at end of visit and wants to proceed with surgery. 1- Pre-operative work up ordered for you(labs/x-rays/EKG). 2- Stop taking Blood thinners,one week before surgery. 3- F/U with PCP for pre-op Medical Clearance. 4- Plan for Laparoscopic Sleeve, possible other indicated procedures. Patient advised that its their responsibility to follow up for studies, referrals and/or labs ordered today. Please note that some or all of this report was generated using voice recognition software. Please notify me in case of any questions about the content of this document, as some errors in transcription may have occurred .

## 2021-03-09 NOTE — PATIENT INSTRUCTIONS
Patient received dietary handouts and education.   - Consistently eat 4x day to include protein with each meal / snack  - Try additional protein powders  - Continue walking, increasing frequency and duration as tolerated d/t knee

## 2021-04-12 ASSESSMENT — ENCOUNTER SYMPTOMS
EYES NEGATIVE: 1
COUGH: 0
GASTROINTESTINAL NEGATIVE: 1
RESPIRATORY NEGATIVE: 1
SHORTNESS OF BREATH: 0
ALLERGIC/IMMUNOLOGIC NEGATIVE: 1

## 2021-04-12 NOTE — PROGRESS NOTES
[facility]) Physicians   [employer]    Subjective:      Patient ID: Gabriela Maynard is a 45 y.o. female    HPI    The patient is here for their bariatric surgery preoperative education group visit. She has made several attempts at weight loss in the past without success and now has been scheduled to have bariatric surgery on May 24, 2021 for future weight loss. She is working to change her dietary behaviors and lose weight to improve comorbid conditions such as hyperlipidemia and GERD. Gabriela Maynard is a very pleasant 45 y.o. female with Body mass index is 43.14 kg/m².     Past Medical History:   Diagnosis Date    Anxiety 2012    Asthma     Depression 2013    DVT of lower extremity, bilateral ([address]  and     while pregnant    Dysmenorrhea 10/14/2011    Insomnia 2013    Migraine headache 11/10/2010    Obesity 2010    Panic attacks 10/17/2012     Past Surgical History:   Procedure Laterality Date     SECTION  1991     SECTION  1995   [facility]ra[facility] Brunner ECTOPIC PREGNANCY SURGERY  2002    right salpingo-oopherectomy    ENDOMETRIAL ABLATION  2012    KNEE ARTHROSCOPY Right     Dr. Rockie Adas [address] [place] [address] [facility]  1995    UPPER GASTROINTESTINAL ENDOSCOPY N/A 2021    EGD BIOPSY performed by Lissette Harkins MD at [address] [facility] ENDOSCOPY     Family History   Problem Relation Age of Onset    High Blood Pressure Brother     Substance Abuse Brother         alcohol    Diabetes Brother     Hypertension Brother     High Blood Pressure Brother     Substance Abuse Brother         alcohol    Diabetes Brother     Hypertension Brother     Substance Abuse Mother         smoker    Cancer Mother         lung cancer    Substance Abuse Father         alcohol  Cirrhosis Father     Other Sister         uterine fibroids    Other Sister         fibrocystic breast disease    Diabetes Sister     High Blood Pressure Sister     Hypertension Sister     Asthma Son      Social History     Tobacco Use    Smoking status: Never Smoker    Smokeless tobacco: Never Used   Substance Use Topics    Alcohol use: Yes     Alcohol/week: 1.0 standard drinks     Types: 1 Glasses of wine per week     Frequency: 2-3 times a week     Drinks per session: 1 or 2     Binge frequency: Weekly     Comment: occasional     I counseled the patient on the importance of not smoking and risks of ETOH. Allergies   Allergen Reactions    Amoxicillin Hives, Shortness Of Breath, Itching and Swelling     tongue swelling    Penicillins Hives, Shortness Of Breath, Itching and Swelling    Red Dye Hives, Itching and Swelling     The patient had a skin reaction to a red hair dye NOT oral medicines      Vitals:    04/20/21 1446   Weight: 213 lb 9.6 oz (96.9 kg)   Height: 4' 11\" (1.499 m)     Body mass index is 43.14 kg/m².     Current Outpatient Medications:     famotidine (PEPCID) 20 MG tablet, Take 1 tablet by mouth daily, Disp: 60 tablet, Rfl: 3    atorvastatin (LIPITOR) 20 MG tablet, Take 1 tablet by mouth daily, Disp: 90 tablet, Rfl: 3    amitriptyline (ELAVIL) 25 MG tablet, Take 25 mg by mouth nightly, Disp: , Rfl:     albuterol (PROVENTIL HFA) 108 (90 BASE) MCG/ACT inhaler, Inhale 2 puffs into the lungs every 4 hours as needed for Wheezing or Shortness of Breath., Disp: 1 Inhaler, Rfl: 12    Lab Results   Component Value Date    WBC 4.6 02/10/2021    RBC 4.24 02/10/2021    HGB 11.6 02/10/2021    HCT 36.8 02/10/2021    MCV 86.9 02/10/2021    MCH 27.5 02/10/2021    MCHC 31.6 02/10/2021    MPV 8.1 02/10/2021    NEUTOPHILPCT 50.7 02/10/2021    LYMPHOPCT 41.3 02/10/2021    MONOPCT 4.7 02/10/2021    EOSRELPCT 2.7 02/10/2021    BASOPCT 0.6 02/10/2021    NEUTROABS 2.3 02/10/2021    LYMPHSABS 1.9 02/10/2021    MONOSABS 0.2 02/10/2021    EOSABS 0.1 02/10/2021     Lab Results   Component Value Date     02/10/2021    K 4.0 02/10/2021     02/10/2021    CO2 25 02/10/2021    ANIONGAP 13 02/10/2021    GLUCOSE 82 02/10/2021    BUN 9 02/10/2021    CREATININE 0.9 02/10/2021    LABGLOM >60 02/10/2021    GFRAA >60 02/10/2021    GFRAA >60 08/15/2012    CALCIUM 10.0 02/10/2021    PROT 7.9 02/10/2021    PROT 7.4 08/15/2012    LABALBU 4.5 02/10/2021    AGRATIO 1.3 02/10/2021    BILITOT <0.2 02/10/2021    ALKPHOS 77 02/10/2021    ALT 10 02/10/2021    AST 17 02/10/2021    GLOB 3.4 02/10/2021     Lab Results   Component Value Date    CHOL 261 02/10/2021    TRIG 68 02/10/2021    HDL 71 02/10/2021    LDLCALC 176 02/10/2021    LABVLDL 14 02/10/2021     Lab Results   Component Value Date    TSHREFLEX 2.47 02/10/2021     Lab Results   Component Value Date    IRON 47 02/10/2021    TIBC 308 02/10/2021    LABIRON 15 02/10/2021     Lab Results   Component Value Date    QTZSCMYM32 1376 02/10/2021    FOLATE 18.55 02/10/2021     Lab Results   Component Value Date    VITD25 32.8 02/10/2021     Lab Results   Component Value Date    LABA1C 4.9 02/10/2021    EAG 93.9 02/10/2021       Review of Systems   Constitutional: Negative. Negative for chills, fatigue and fever. HENT: Negative. Eyes: Negative. Respiratory: Negative. Negative for cough and shortness of breath. Cardiovascular: Negative. Gastrointestinal: Negative. Endocrine: Negative. Genitourinary: Negative. Musculoskeletal: Negative. Skin: Negative. Allergic/Immunologic: Negative. Neurological: Negative. Hematological: Negative. Psychiatric/Behavioral: Negative. Objective:     Physical Exam  Vitals signs reviewed. Constitutional:       Appearance: She is well-developed. HENT:      Head: Normocephalic and atraumatic.    Eyes:      Conjunctiva/sclera: Conjunctivae normal.      Pupils: Pupils are equal, round, and reactive to light.   Neck:      Musculoskeletal: Normal range of motion and neck supple. Pulmonary:      Effort: Pulmonary effort is normal.   Abdominal:      Palpations: Abdomen is soft. Musculoskeletal: Normal range of motion. Skin:     General: Skin is warm and dry. Neurological:      Mental Status: She is alert and oriented to person, place, and time. Psychiatric:         Behavior: Behavior normal.         Thought Content: Thought content normal.         Judgment: Judgment normal.       Assessment and Plan:   Patient is here for their preoperative education group visit for sleeve gastrectomy. The patient is up 0.6 lbs today. The patient's current Body mass index is 43.14 kg/m². (4/20/21). She is making good dietary and behavior modifications and is considered to be a good surgical candidate. Patient has a diagnosis of hyperlipidemia. Discussed the benefits of weight loss and dietary changes on lipids and cholesterol. Discussed the fact that they will be required to crush or open their medications for the first two weeks after surgery and reviewed those medications that can not be crushed. Patient has a diagnosis of chronic GERD and takes a H2 Blocker. Discussed the benefits of weight loss and dietary changes on acid reflux. However, they will most likely need to continue their medication short term after surgery as they adjust to the new diet. Discussed the fact that they will be required to crush or open their medications for the first two weeks after surgery and reviewed those medications that can not be crushed. Patient received instruction that it is recommended to avoid pregnancy following bariatric surgery for at least 2 years to allow them to have stable weight loss and to help avoid increased risk of vitamin deficiencies and malnutrition. The patient was encouraged to discuss possible contraceptive methods with their PCP or OBGYN.     Patient received instructions from the registered dietitian in reference to the two week preoperative diet and the four phases of their postoperative diet. In addition I reviewed these instructions and stressed the importance of following these recommendations for their safety. Patient completed the preoperative class where they were provided with education related to their bariatric surgery, common surgical complications, medications preoperatively & postoperatively, special concerns related to bariatric surgery postoperatively, vitamin supplementation, patient agreement, PAT & scheduling, hospital course, wellness discovery program and what to do in the case of an emergency postoperatively. The dietitians reviewed all preoperative and postoperative diet instructions. Patient was given the opportunity to ask questions during the group visit and these questions were answered by myself and/or the dietitian. I spent a total of 40 minutes on the day of the visit and over half of that time was spent counseling the patient on proper dietary behaviors, exercise and surgery protocols.

## 2021-04-19 NOTE — PROGRESS NOTES
Patient Name:   Jewels Lawson      Type of Surgery:  Sleeve         Date of Surgery:  5/24/21       Start Pre-Op Diet On:  5/11/21       Start Clear Liquids On:  5/23/21       If you are having a gastric bypass, start Bowel Prep between 2-4pm the day prior to surgery. NPO after midnight the night before your surgery! Take morning medications with a small amount of water.     NOTES:_______________________________________  ______________________________________________

## 2021-04-20 ENCOUNTER — OFFICE VISIT (OUTPATIENT)
Dept: BARIATRICS/WEIGHT MGMT | Age: 51
End: 2021-04-20
Payer: COMMERCIAL

## 2021-04-20 VITALS — BODY MASS INDEX: 43.06 KG/M2 | HEIGHT: 59 IN | WEIGHT: 213.6 LBS

## 2021-04-20 DIAGNOSIS — K21.9 CHRONIC GERD: ICD-10-CM

## 2021-04-20 DIAGNOSIS — E66.01 MORBID OBESITY WITH BMI OF 40.0-44.9, ADULT (HCC): ICD-10-CM

## 2021-04-20 DIAGNOSIS — E78.2 MIXED HYPERLIPIDEMIA: Primary | ICD-10-CM

## 2021-04-20 PROCEDURE — 99214 OFFICE O/P EST MOD 30 MIN: CPT | Performed by: NURSE PRACTITIONER

## 2021-04-23 ENCOUNTER — TELEPHONE (OUTPATIENT)
Dept: BARIATRICS/WEIGHT MGMT | Age: 51
End: 2021-04-23

## 2021-04-23 NOTE — TELEPHONE ENCOUNTER
[employer] Approval for sleeve CPT [id number] submitted via [employer]   DOS 5/24/21  Ref # [id number]

## 2021-04-27 NOTE — PROGRESS NOTES
information:  Hubba/patient-eprep              20.During flu season no children under the age of 15 are permitted in the hospital for the safety of all patients. 21. If you take a long acting insulin in the evening only  take half of your usual  dose the night  before your procedure              22. If you use a c-pap please bring DOS if staying overnight,             23.For your convenience [facility] has a pharmacy on site to fill your prescriptions. 24. If you use oxygen and have a portable tank please bring it  with you the DOS             25. Bring a complete list of all your medications with name and dose include any supplements. 26. Other___PCP 5/12  PATs 5/18_______________________________________   *Please call pre admission testing if you any further questions   Hariscorey Pereakim   [address]    [facility]  [id number]   [facility]    __ Goran Abreu _____  _x_ Scheduled __5/18_ Where __mff_   __ Other __________      UDGCXVB POLICY(subject to change)    There is a one visitor policy at [facility] for all surgeries and endoscopies. Whether the visitor can stay or will be asked to wait in the car will depend on the current policy and if social distancing can be maintained. The policy is subject to change at any time. Please make sure the visitor has a cell phone that is on,charged and able to accept calls, as this may be the way that the staff communicates with them. Pain management is NO VISITOR policyThe patients ride is expected to remain in the car with a cell phone for communication. If the ride is leaving the hospital grounds please make sure they are back in time for pickup. Have the patient inform the staff on arrival what their rides plans are while the patient is in the facility. At the MAIN there is one visitor allowed. Please note that the visitor policy is subject to change. All above information reviewed with patient in person or by phone. Patient verbalizes understanding. All questions and concerns addressed.                                                                                                  Patient/Rep__per phone/pt__________________                                                                                                                                    PRE OP INSTRUCTIONS

## 2021-05-12 ENCOUNTER — TELEPHONE (OUTPATIENT)
Dept: BARIATRICS/WEIGHT MGMT | Age: 51
End: 2021-05-12

## 2021-05-12 NOTE — TELEPHONE ENCOUNTER
Pt called with questions about the pre-op diet. Started yesterday, 5/11. Pt asked if she could put a bouillon cube in low sodium chicken broth. I encouraged her to try the bouillon cube in water first to see if it is satisfying enough without the extra broth. Pt also asked if there are restrictions on pastas, etc in soup varieties described in pre-op class. I reviewed with patient that 1 serving should be 15g carb or less within the brands we provided pt in class. Lastly, we discussed non-greek yogurt options. All questions/concerns addressed.

## 2021-05-18 ENCOUNTER — HOSPITAL ENCOUNTER (OUTPATIENT)
Age: 51
Discharge: HOME | End: 2021-05-18
Payer: COMMERCIAL

## 2021-05-18 ENCOUNTER — OFFICE VISIT (OUTPATIENT)
Dept: PRIMARY CARE CLINIC | Age: 51
End: 2021-05-18

## 2021-05-18 DIAGNOSIS — Z01.818 PREOP TESTING: ICD-10-CM

## 2021-05-18 DIAGNOSIS — Z20.828 EXPOSURE TO SARS-ASSOCIATED CORONAVIRUS: Primary | ICD-10-CM

## 2021-05-18 LAB
A/G RATIO: 1.4 (ref 1.1–2.2)
ABO/RH: NORMAL
ALBUMIN SERPL-MCNC: 4.5 G/DL (ref 3.4–5)
ALP BLD-CCNC: 75 U/L (ref 40–129)
ALT SERPL-CCNC: 14 U/L (ref 10–40)
ANION GAP SERPL CALCULATED.3IONS-SCNC: 12 MMOL/L (ref 3–16)
ANTIBODY SCREEN: NORMAL
AST SERPL-CCNC: 18 U/L (ref 15–37)
BILIRUB SERPL-MCNC: <0.2 MG/DL (ref 0–1)
BUN BLDV-MCNC: 18 MG/DL (ref 7–20)
CALCIUM SERPL-MCNC: 9.5 MG/DL (ref 8.3–10.6)
CHLORIDE BLD-SCNC: 103 MMOL/L (ref 99–110)
CO2: 22 MMOL/L (ref 21–32)
CREAT SERPL-MCNC: 0.8 MG/DL (ref 0.6–1.1)
GFR AFRICAN AMERICAN: >60
GFR NON-AFRICAN AMERICAN: >60
GLOBULIN: 3.3 G/DL
GLUCOSE BLD-MCNC: 82 MG/DL (ref 70–99)
HCT VFR BLD CALC: 39 % (ref 36–48)
HEMOGLOBIN: 12.6 G/DL (ref 12–16)
MCH RBC QN AUTO: 28.3 PG (ref 26–34)
MCHC RBC AUTO-ENTMCNC: 32.3 G/DL (ref 31–36)
MCV RBC AUTO: 87.7 FL (ref 80–100)
PDW BLD-RTO: 15.5 % (ref 12.4–15.4)
PLATELET # BLD: 350 K/UL (ref 135–450)
PMV BLD AUTO: 7.6 FL (ref 5–10.5)
POTASSIUM SERPL-SCNC: 4.2 MMOL/L (ref 3.5–5.1)
RBC # BLD: 4.45 M/UL (ref 4–5.2)
SARS-COV-2: NOT DETECTED
SODIUM BLD-SCNC: 137 MMOL/L (ref 136–145)
TOTAL PROTEIN: 7.8 G/DL (ref 6.4–8.2)
WBC # BLD: 4.4 K/UL (ref 4–11)

## 2021-05-18 PROCEDURE — 36415 COLL VENOUS BLD VENIPUNCTURE: CPT

## 2021-05-18 PROCEDURE — 86901 BLOOD TYPING SEROLOGIC RH(D): CPT

## 2021-05-18 PROCEDURE — 99211 OFF/OP EST MAY X REQ PHY/QHP: CPT | Performed by: NURSE PRACTITIONER

## 2021-05-18 PROCEDURE — 80053 COMPREHEN METABOLIC PANEL: CPT

## 2021-05-18 PROCEDURE — 85027 COMPLETE CBC AUTOMATED: CPT

## 2021-05-18 PROCEDURE — 86900 BLOOD TYPING SEROLOGIC ABO: CPT

## 2021-05-18 PROCEDURE — 86850 RBC ANTIBODY SCREEN: CPT

## 2021-05-20 NOTE — PATIENT INSTRUCTIONS

## 2021-05-20 NOTE — PROGRESS NOTES
Danny Barriga received a viral test for COVID-19. They were educated on isolation and quarantine as appropriate. For any symptoms, they were directed to seek care from their PCP, given contact information to establish with a doctor, directed to an urgent care or the emergency room.

## 2021-05-23 ENCOUNTER — ANESTHESIA EVENT (OUTPATIENT)
Dept: OPERATING ROOM | Age: 51
DRG: 620 | End: 2021-05-23
Payer: COMMERCIAL

## 2021-05-24 ENCOUNTER — APPOINTMENT (OUTPATIENT)
Dept: GENERAL RADIOLOGY | Age: 51
DRG: 620 | End: 2021-05-24
Attending: SURGERY
Payer: COMMERCIAL

## 2021-05-24 ENCOUNTER — ANESTHESIA (OUTPATIENT)
Dept: OPERATING ROOM | Age: 51
DRG: 620 | End: 2021-05-24
Payer: COMMERCIAL

## 2021-05-24 ENCOUNTER — HOSPITAL ENCOUNTER (INPATIENT)
Age: 51
LOS: 1 days | Discharge: HOME | DRG: 620 | End: 2021-05-25
Attending: SURGERY | Admitting: SURGERY
Payer: COMMERCIAL

## 2021-05-24 VITALS
OXYGEN SATURATION: 95 % | SYSTOLIC BLOOD PRESSURE: 150 MMHG | DIASTOLIC BLOOD PRESSURE: 90 MMHG | TEMPERATURE: 95.7 F | RESPIRATION RATE: 10 BRPM

## 2021-05-24 DIAGNOSIS — Z98.84 S/P LAPAROSCOPIC SLEEVE GASTRECTOMY: ICD-10-CM

## 2021-05-24 DIAGNOSIS — Z01.818 PREOP TESTING: Primary | ICD-10-CM

## 2021-05-24 DIAGNOSIS — K44.9 HIATAL HERNIA: ICD-10-CM

## 2021-05-24 DIAGNOSIS — K43.0 INCARCERATED INCISIONAL HERNIA: ICD-10-CM

## 2021-05-24 LAB
ABO/RH: NORMAL
ANTIBODY SCREEN: NORMAL
GLUCOSE BLD-MCNC: 83 MG/DL (ref 70–99)
PERFORMED ON: NORMAL

## 2021-05-24 PROCEDURE — 2580000003 HC RX 258: Performed by: SURGERY

## 2021-05-24 PROCEDURE — 6360000002 HC RX W HCPCS: Performed by: SURGERY

## 2021-05-24 PROCEDURE — 6370000000 HC RX 637 (ALT 250 FOR IP): Performed by: SURGERY

## 2021-05-24 PROCEDURE — 0BQT4ZZ REPAIR DIAPHRAGM, PERCUTANEOUS ENDOSCOPIC APPROACH: ICD-10-PCS | Performed by: SURGERY

## 2021-05-24 PROCEDURE — 3600000015 HC SURGERY LEVEL 5 ADDTL 15MIN: Performed by: SURGERY

## 2021-05-24 PROCEDURE — 2500000003 HC RX 250 WO HCPCS: Performed by: SURGERY

## 2021-05-24 PROCEDURE — 86900 BLOOD TYPING SEROLOGIC ABO: CPT

## 2021-05-24 PROCEDURE — 36415 COLL VENOUS BLD VENIPUNCTURE: CPT

## 2021-05-24 PROCEDURE — 86901 BLOOD TYPING SEROLOGIC RH(D): CPT

## 2021-05-24 PROCEDURE — 88307 TISSUE EXAM BY PATHOLOGIST: CPT

## 2021-05-24 PROCEDURE — 6360000002 HC RX W HCPCS: Performed by: ANESTHESIOLOGY

## 2021-05-24 PROCEDURE — 0WQF4ZZ REPAIR ABDOMINAL WALL, PERCUTANEOUS ENDOSCOPIC APPROACH: ICD-10-PCS | Performed by: SURGERY

## 2021-05-24 PROCEDURE — 3700000001 HC ADD 15 MINUTES (ANESTHESIA): Performed by: SURGERY

## 2021-05-24 PROCEDURE — 2720000010 HC SURG SUPPLY STERILE: Performed by: SURGERY

## 2021-05-24 PROCEDURE — 6370000000 HC RX 637 (ALT 250 FOR IP)

## 2021-05-24 PROCEDURE — 7100000000 HC PACU RECOVERY - FIRST 15 MIN: Performed by: SURGERY

## 2021-05-24 PROCEDURE — 94150 VITAL CAPACITY TEST: CPT

## 2021-05-24 PROCEDURE — C9113 INJ PANTOPRAZOLE SODIUM, VIA: HCPCS | Performed by: SURGERY

## 2021-05-24 PROCEDURE — 7100000001 HC PACU RECOVERY - ADDTL 15 MIN: Performed by: SURGERY

## 2021-05-24 PROCEDURE — 49655 PR LAP, INCISIONAL HERNIA REPAIR,INCARCERATED: CPT | Performed by: SURGERY

## 2021-05-24 PROCEDURE — 88342 IMHCHEM/IMCYTCHM 1ST ANTB: CPT

## 2021-05-24 PROCEDURE — 86850 RBC ANTIBODY SCREEN: CPT

## 2021-05-24 PROCEDURE — 2709999900 HC NON-CHARGEABLE SUPPLY: Performed by: SURGERY

## 2021-05-24 PROCEDURE — 94761 N-INVAS EAR/PLS OXIMETRY MLT: CPT

## 2021-05-24 PROCEDURE — 2500000003 HC RX 250 WO HCPCS: Performed by: NURSE ANESTHETIST, CERTIFIED REGISTERED

## 2021-05-24 PROCEDURE — 43775 LAP SLEEVE GASTRECTOMY: CPT | Performed by: SURGERY

## 2021-05-24 PROCEDURE — 43281 LAP PARAESOPHAG HERN REPAIR: CPT | Performed by: SURGERY

## 2021-05-24 PROCEDURE — C1776 JOINT DEVICE (IMPLANTABLE): HCPCS | Performed by: SURGERY

## 2021-05-24 PROCEDURE — 0DB64Z3 EXCISION OF STOMACH, PERCUTANEOUS ENDOSCOPIC APPROACH, VERTICAL: ICD-10-PCS | Performed by: SURGERY

## 2021-05-24 PROCEDURE — 3600000005 HC SURGERY LEVEL 5 BASE: Performed by: SURGERY

## 2021-05-24 PROCEDURE — 1200000000 HC SEMI PRIVATE

## 2021-05-24 PROCEDURE — P9045 ALBUMIN (HUMAN), 5%, 250 ML: HCPCS | Performed by: NURSE ANESTHETIST, CERTIFIED REGISTERED

## 2021-05-24 PROCEDURE — 3700000000 HC ANESTHESIA ATTENDED CARE: Performed by: SURGERY

## 2021-05-24 PROCEDURE — 6360000002 HC RX W HCPCS: Performed by: NURSE ANESTHETIST, CERTIFIED REGISTERED

## 2021-05-24 RX ORDER — METOCLOPRAMIDE HYDROCHLORIDE 5 MG/ML
10 INJECTION INTRAMUSCULAR; INTRAVENOUS EVERY 6 HOURS PRN
Status: DISCONTINUED | OUTPATIENT
Start: 2021-05-24 | End: 2021-05-25 | Stop reason: HOSPADM

## 2021-05-24 RX ORDER — CLINDAMYCIN PHOSPHATE 900 MG/50ML
900 INJECTION INTRAVENOUS EVERY 8 HOURS
Status: COMPLETED | OUTPATIENT
Start: 2021-05-24 | End: 2021-05-25

## 2021-05-24 RX ORDER — SODIUM CHLORIDE, SODIUM LACTATE, POTASSIUM CHLORIDE, CALCIUM CHLORIDE 600; 310; 30; 20 MG/100ML; MG/100ML; MG/100ML; MG/100ML
INJECTION, SOLUTION INTRAVENOUS CONTINUOUS
Status: DISCONTINUED | OUTPATIENT
Start: 2021-05-24 | End: 2021-05-24

## 2021-05-24 RX ORDER — MAGNESIUM SULFATE HEPTAHYDRATE 500 MG/ML
INJECTION, SOLUTION INTRAMUSCULAR; INTRAVENOUS PRN
Status: DISCONTINUED | OUTPATIENT
Start: 2021-05-24 | End: 2021-05-24 | Stop reason: SDUPTHER

## 2021-05-24 RX ORDER — HYDRALAZINE HYDROCHLORIDE 20 MG/ML
5 INJECTION INTRAMUSCULAR; INTRAVENOUS EVERY 10 MIN PRN
Status: DISCONTINUED | OUTPATIENT
Start: 2021-05-24 | End: 2021-05-24 | Stop reason: HOSPADM

## 2021-05-24 RX ORDER — HYDRALAZINE HYDROCHLORIDE 20 MG/ML
10 INJECTION INTRAMUSCULAR; INTRAVENOUS
Status: DISCONTINUED | OUTPATIENT
Start: 2021-05-24 | End: 2021-05-25 | Stop reason: HOSPADM

## 2021-05-24 RX ORDER — SUCCINYLCHOLINE/SOD CL,ISO/PF 200MG/10ML
SYRINGE (ML) INTRAVENOUS PRN
Status: DISCONTINUED | OUTPATIENT
Start: 2021-05-24 | End: 2021-05-24 | Stop reason: SDUPTHER

## 2021-05-24 RX ORDER — METHYLENE BLUE 10 MG/ML
INJECTION INTRAVENOUS
Status: COMPLETED | OUTPATIENT
Start: 2021-05-24 | End: 2021-05-24

## 2021-05-24 RX ORDER — LIDOCAINE HYDROCHLORIDE 10 MG/ML
0.5 INJECTION, SOLUTION EPIDURAL; INFILTRATION; INTRACAUDAL; PERINEURAL ONCE
Status: DISCONTINUED | OUTPATIENT
Start: 2021-05-24 | End: 2021-05-24 | Stop reason: HOSPADM

## 2021-05-24 RX ORDER — ALBUTEROL SULFATE 90 UG/1
2 AEROSOL, METERED RESPIRATORY (INHALATION) EVERY 4 HOURS PRN
Status: DISCONTINUED | OUTPATIENT
Start: 2021-05-24 | End: 2021-05-25 | Stop reason: HOSPADM

## 2021-05-24 RX ORDER — ALBUMIN, HUMAN INJ 5% 5 %
SOLUTION INTRAVENOUS PRN
Status: DISCONTINUED | OUTPATIENT
Start: 2021-05-24 | End: 2021-05-24 | Stop reason: SDUPTHER

## 2021-05-24 RX ORDER — ONDANSETRON 2 MG/ML
4 INJECTION INTRAMUSCULAR; INTRAVENOUS
Status: DISCONTINUED | OUTPATIENT
Start: 2021-05-24 | End: 2021-05-24 | Stop reason: HOSPADM

## 2021-05-24 RX ORDER — PROCHLORPERAZINE EDISYLATE 5 MG/ML
5 INJECTION INTRAMUSCULAR; INTRAVENOUS
Status: DISCONTINUED | OUTPATIENT
Start: 2021-05-24 | End: 2021-05-24 | Stop reason: HOSPADM

## 2021-05-24 RX ORDER — PANTOPRAZOLE SODIUM 40 MG/10ML
40 INJECTION, POWDER, LYOPHILIZED, FOR SOLUTION INTRAVENOUS DAILY
Status: DISCONTINUED | OUTPATIENT
Start: 2021-05-24 | End: 2021-05-25 | Stop reason: HOSPADM

## 2021-05-24 RX ORDER — FENTANYL CITRATE 50 UG/ML
25 INJECTION, SOLUTION INTRAMUSCULAR; INTRAVENOUS EVERY 5 MIN PRN
Status: DISCONTINUED | OUTPATIENT
Start: 2021-05-24 | End: 2021-05-24 | Stop reason: HOSPADM

## 2021-05-24 RX ORDER — HYDROMORPHONE HCL 110MG/55ML
0.5 PATIENT CONTROLLED ANALGESIA SYRINGE INTRAVENOUS EVERY 5 MIN PRN
Status: DISCONTINUED | OUTPATIENT
Start: 2021-05-24 | End: 2021-05-24 | Stop reason: HOSPADM

## 2021-05-24 RX ORDER — SODIUM CHLORIDE, SODIUM LACTATE, POTASSIUM CHLORIDE, CALCIUM CHLORIDE 600; 310; 30; 20 MG/100ML; MG/100ML; MG/100ML; MG/100ML
INJECTION, SOLUTION INTRAVENOUS CONTINUOUS
Status: DISCONTINUED | OUTPATIENT
Start: 2021-05-24 | End: 2021-05-25 | Stop reason: HOSPADM

## 2021-05-24 RX ORDER — PROPOFOL 10 MG/ML
INJECTION, EMULSION INTRAVENOUS PRN
Status: DISCONTINUED | OUTPATIENT
Start: 2021-05-24 | End: 2021-05-24 | Stop reason: SDUPTHER

## 2021-05-24 RX ORDER — MIDAZOLAM HYDROCHLORIDE 1 MG/ML
INJECTION INTRAMUSCULAR; INTRAVENOUS PRN
Status: DISCONTINUED | OUTPATIENT
Start: 2021-05-24 | End: 2021-05-24 | Stop reason: SDUPTHER

## 2021-05-24 RX ORDER — SODIUM CHLORIDE 9 MG/ML
25 INJECTION, SOLUTION INTRAVENOUS PRN
Status: DISCONTINUED | OUTPATIENT
Start: 2021-05-24 | End: 2021-05-25 | Stop reason: HOSPADM

## 2021-05-24 RX ORDER — LABETALOL HYDROCHLORIDE 5 MG/ML
5 INJECTION, SOLUTION INTRAVENOUS EVERY 10 MIN PRN
Status: DISCONTINUED | OUTPATIENT
Start: 2021-05-24 | End: 2021-05-24 | Stop reason: HOSPADM

## 2021-05-24 RX ORDER — ONDANSETRON 2 MG/ML
INJECTION INTRAMUSCULAR; INTRAVENOUS PRN
Status: DISCONTINUED | OUTPATIENT
Start: 2021-05-24 | End: 2021-05-24 | Stop reason: SDUPTHER

## 2021-05-24 RX ORDER — SODIUM CHLORIDE 0.9 % (FLUSH) 0.9 %
10 SYRINGE (ML) INJECTION EVERY 12 HOURS SCHEDULED
Status: DISCONTINUED | OUTPATIENT
Start: 2021-05-24 | End: 2021-05-25 | Stop reason: HOSPADM

## 2021-05-24 RX ORDER — SODIUM CHLORIDE 9 MG/ML
10 INJECTION INTRAVENOUS DAILY
Status: DISCONTINUED | OUTPATIENT
Start: 2021-05-24 | End: 2021-05-25 | Stop reason: HOSPADM

## 2021-05-24 RX ORDER — HYDROMORPHONE HCL IN WATER/PF 6 MG/30 ML
PATIENT CONTROLLED ANALGESIA SYRINGE INTRAVENOUS CONTINUOUS
Status: DISCONTINUED | OUTPATIENT
Start: 2021-05-24 | End: 2021-05-25

## 2021-05-24 RX ORDER — GLYCOPYRROLATE 1 MG/5 ML
SYRINGE (ML) INTRAVENOUS PRN
Status: DISCONTINUED | OUTPATIENT
Start: 2021-05-24 | End: 2021-05-24 | Stop reason: SDUPTHER

## 2021-05-24 RX ORDER — APREPITANT 80 MG/1
80 CAPSULE ORAL ONCE
Status: COMPLETED | OUTPATIENT
Start: 2021-05-24 | End: 2021-05-24

## 2021-05-24 RX ORDER — CLINDAMYCIN PHOSPHATE 900 MG/50ML
900 INJECTION INTRAVENOUS EVERY 8 HOURS
Status: DISCONTINUED | OUTPATIENT
Start: 2021-05-24 | End: 2021-05-24

## 2021-05-24 RX ORDER — ONDANSETRON 2 MG/ML
4 INJECTION INTRAMUSCULAR; INTRAVENOUS EVERY 6 HOURS PRN
Status: DISCONTINUED | OUTPATIENT
Start: 2021-05-24 | End: 2021-05-25 | Stop reason: HOSPADM

## 2021-05-24 RX ORDER — BUPIVACAINE HYDROCHLORIDE 5 MG/ML
INJECTION, SOLUTION EPIDURAL; INTRACAUDAL
Status: COMPLETED | OUTPATIENT
Start: 2021-05-24 | End: 2021-05-24

## 2021-05-24 RX ORDER — SODIUM CHLORIDE 0.9 % (FLUSH) 0.9 %
10 SYRINGE (ML) INJECTION PRN
Status: DISCONTINUED | OUTPATIENT
Start: 2021-05-24 | End: 2021-05-25 | Stop reason: HOSPADM

## 2021-05-24 RX ORDER — SCOLOPAMINE TRANSDERMAL SYSTEM 1 MG/1
1 PATCH, EXTENDED RELEASE TRANSDERMAL
Status: DISCONTINUED | OUTPATIENT
Start: 2021-05-24 | End: 2021-05-25 | Stop reason: HOSPADM

## 2021-05-24 RX ORDER — CIPROFLOXACIN 2 MG/ML
400 INJECTION, SOLUTION INTRAVENOUS EVERY 12 HOURS
Status: COMPLETED | OUTPATIENT
Start: 2021-05-24 | End: 2021-05-25

## 2021-05-24 RX ORDER — DIPHENHYDRAMINE HYDROCHLORIDE 50 MG/ML
12.5 INJECTION INTRAMUSCULAR; INTRAVENOUS EVERY 6 HOURS PRN
Status: DISCONTINUED | OUTPATIENT
Start: 2021-05-24 | End: 2021-05-25 | Stop reason: HOSPADM

## 2021-05-24 RX ORDER — HYOSCYAMINE SULFATE 0.5 MG/ML
250 INJECTION, SOLUTION SUBCUTANEOUS EVERY 4 HOURS PRN
Status: DISCONTINUED | OUTPATIENT
Start: 2021-05-24 | End: 2021-05-25 | Stop reason: HOSPADM

## 2021-05-24 RX ORDER — HYDROMORPHONE HYDROCHLORIDE 1 MG/ML
0.5 INJECTION, SOLUTION INTRAMUSCULAR; INTRAVENOUS; SUBCUTANEOUS
Status: DISCONTINUED | OUTPATIENT
Start: 2021-05-24 | End: 2021-05-25 | Stop reason: HOSPADM

## 2021-05-24 RX ORDER — NALOXONE HYDROCHLORIDE 0.4 MG/ML
0.4 INJECTION, SOLUTION INTRAMUSCULAR; INTRAVENOUS; SUBCUTANEOUS PRN
Status: DISCONTINUED | OUTPATIENT
Start: 2021-05-24 | End: 2021-05-25

## 2021-05-24 RX ORDER — SCOLOPAMINE TRANSDERMAL SYSTEM 1 MG/1
1 PATCH, EXTENDED RELEASE TRANSDERMAL ONCE
Status: DISCONTINUED | OUTPATIENT
Start: 2021-05-24 | End: 2021-05-25

## 2021-05-24 RX ORDER — CIPROFLOXACIN 2 MG/ML
400 INJECTION, SOLUTION INTRAVENOUS EVERY 12 HOURS
Status: DISCONTINUED | OUTPATIENT
Start: 2021-05-24 | End: 2021-05-24 | Stop reason: HOSPADM

## 2021-05-24 RX ORDER — SODIUM CHLORIDE 0.9 % (FLUSH) 0.9 %
10 SYRINGE (ML) INJECTION PRN
Status: DISCONTINUED | OUTPATIENT
Start: 2021-05-24 | End: 2021-05-24 | Stop reason: HOSPADM

## 2021-05-24 RX ORDER — FENTANYL CITRATE 50 UG/ML
INJECTION, SOLUTION INTRAMUSCULAR; INTRAVENOUS PRN
Status: DISCONTINUED | OUTPATIENT
Start: 2021-05-24 | End: 2021-05-24 | Stop reason: SDUPTHER

## 2021-05-24 RX ORDER — HYDROCODONE BITARTRATE AND ACETAMINOPHEN 5; 325 MG/1; MG/1
1 TABLET ORAL
Status: DISCONTINUED | OUTPATIENT
Start: 2021-05-24 | End: 2021-05-24 | Stop reason: HOSPADM

## 2021-05-24 RX ORDER — ROCURONIUM BROMIDE 10 MG/ML
INJECTION, SOLUTION INTRAVENOUS PRN
Status: DISCONTINUED | OUTPATIENT
Start: 2021-05-24 | End: 2021-05-24 | Stop reason: SDUPTHER

## 2021-05-24 RX ORDER — HYDROMORPHONE HCL 110MG/55ML
PATIENT CONTROLLED ANALGESIA SYRINGE INTRAVENOUS PRN
Status: DISCONTINUED | OUTPATIENT
Start: 2021-05-24 | End: 2021-05-24 | Stop reason: SDUPTHER

## 2021-05-24 RX ORDER — LIDOCAINE HYDROCHLORIDE 10 MG/ML
1 INJECTION, SOLUTION EPIDURAL; INFILTRATION; INTRACAUDAL; PERINEURAL
Status: DISCONTINUED | OUTPATIENT
Start: 2021-05-24 | End: 2021-05-24 | Stop reason: HOSPADM

## 2021-05-24 RX ORDER — LORAZEPAM 1 MG/1
1 TABLET ORAL EVERY 6 HOURS PRN
COMMUNITY

## 2021-05-24 RX ORDER — LIDOCAINE 4 G/G
1 PATCH TOPICAL DAILY
Status: DISCONTINUED | OUTPATIENT
Start: 2021-05-24 | End: 2021-05-25 | Stop reason: HOSPADM

## 2021-05-24 RX ORDER — KETAMINE HCL IN NACL, ISO-OSM 100MG/10ML
SYRINGE (ML) INJECTION PRN
Status: DISCONTINUED | OUTPATIENT
Start: 2021-05-24 | End: 2021-05-24 | Stop reason: SDUPTHER

## 2021-05-24 RX ORDER — LIDOCAINE HYDROCHLORIDE 20 MG/ML
INJECTION, SOLUTION EPIDURAL; INFILTRATION; INTRACAUDAL; PERINEURAL PRN
Status: DISCONTINUED | OUTPATIENT
Start: 2021-05-24 | End: 2021-05-24 | Stop reason: SDUPTHER

## 2021-05-24 RX ORDER — MAGNESIUM HYDROXIDE 1200 MG/15ML
LIQUID ORAL CONTINUOUS PRN
Status: COMPLETED | OUTPATIENT
Start: 2021-05-24 | End: 2021-05-24

## 2021-05-24 RX ORDER — PHENYLEPHRINE HCL IN 0.9% NACL 1 MG/10 ML
SYRINGE (ML) INTRAVENOUS PRN
Status: DISCONTINUED | OUTPATIENT
Start: 2021-05-24 | End: 2021-05-24 | Stop reason: SDUPTHER

## 2021-05-24 RX ORDER — PROMETHAZINE HYDROCHLORIDE 25 MG/ML
6.25 INJECTION, SOLUTION INTRAMUSCULAR; INTRAVENOUS EVERY 6 HOURS PRN
Status: DISCONTINUED | OUTPATIENT
Start: 2021-05-24 | End: 2021-05-25 | Stop reason: HOSPADM

## 2021-05-24 RX ORDER — DEXAMETHASONE SODIUM PHOSPHATE 4 MG/ML
INJECTION, SOLUTION INTRA-ARTICULAR; INTRALESIONAL; INTRAMUSCULAR; INTRAVENOUS; SOFT TISSUE PRN
Status: DISCONTINUED | OUTPATIENT
Start: 2021-05-24 | End: 2021-05-24 | Stop reason: SDUPTHER

## 2021-05-24 RX ORDER — SODIUM CHLORIDE 9 MG/ML
25 INJECTION, SOLUTION INTRAVENOUS PRN
Status: DISCONTINUED | OUTPATIENT
Start: 2021-05-24 | End: 2021-05-24 | Stop reason: HOSPADM

## 2021-05-24 RX ORDER — SODIUM CHLORIDE 0.9 % (FLUSH) 0.9 %
10 SYRINGE (ML) INJECTION EVERY 12 HOURS SCHEDULED
Status: DISCONTINUED | OUTPATIENT
Start: 2021-05-24 | End: 2021-05-24 | Stop reason: HOSPADM

## 2021-05-24 RX ADMIN — CIPROFLOXACIN 400 MG: 2 INJECTION, SOLUTION INTRAVENOUS at 20:59

## 2021-05-24 RX ADMIN — Medication 100 MCG: at 07:45

## 2021-05-24 RX ADMIN — PROPOFOL 200 MG: 10 INJECTION, EMULSION INTRAVENOUS at 07:28

## 2021-05-24 RX ADMIN — HYDROMORPHONE HYDROCHLORIDE 0.4 MG: 2 INJECTION, SOLUTION INTRAMUSCULAR; INTRAVENOUS; SUBCUTANEOUS at 08:22

## 2021-05-24 RX ADMIN — Medication 10 MG: at 09:53

## 2021-05-24 RX ADMIN — CLINDAMYCIN PHOSPHATE 900 MG: 900 INJECTION, SOLUTION INTRAVENOUS at 07:21

## 2021-05-24 RX ADMIN — APREPITANT 80 MG: 80 CAPSULE ORAL at 06:57

## 2021-05-24 RX ADMIN — Medication 100 MCG: at 08:32

## 2021-05-24 RX ADMIN — SODIUM CHLORIDE, POTASSIUM CHLORIDE, SODIUM LACTATE AND CALCIUM CHLORIDE: 600; 310; 30; 20 INJECTION, SOLUTION INTRAVENOUS at 08:45

## 2021-05-24 RX ADMIN — ALBUMIN (HUMAN) 250 ML: 12.5 INJECTION, SOLUTION INTRAVENOUS at 07:39

## 2021-05-24 RX ADMIN — HYDROMORPHONE HYDROCHLORIDE 0.2 MG: 2 INJECTION, SOLUTION INTRAMUSCULAR; INTRAVENOUS; SUBCUTANEOUS at 09:01

## 2021-05-24 RX ADMIN — SODIUM CHLORIDE, POTASSIUM CHLORIDE, SODIUM LACTATE AND CALCIUM CHLORIDE: 600; 310; 30; 20 INJECTION, SOLUTION INTRAVENOUS at 07:08

## 2021-05-24 RX ADMIN — CLINDAMYCIN IN 5 PERCENT DEXTROSE 900 MG: 18 INJECTION, SOLUTION INTRAVENOUS at 13:37

## 2021-05-24 RX ADMIN — CIPROFLOXACIN 400 MG: 2 INJECTION, SOLUTION INTRAVENOUS at 07:56

## 2021-05-24 RX ADMIN — FENTANYL CITRATE 50 MCG: 50 INJECTION, SOLUTION INTRAMUSCULAR; INTRAVENOUS at 07:28

## 2021-05-24 RX ADMIN — ENOXAPARIN SODIUM 30 MG: 30 INJECTION SUBCUTANEOUS at 06:58

## 2021-05-24 RX ADMIN — Medication 10 MCG: at 07:46

## 2021-05-24 RX ADMIN — Medication 100 MCG: at 07:32

## 2021-05-24 RX ADMIN — ENOXAPARIN SODIUM 30 MG: 30 INJECTION SUBCUTANEOUS at 20:48

## 2021-05-24 RX ADMIN — CLINDAMYCIN IN 5 PERCENT DEXTROSE 900 MG: 18 INJECTION, SOLUTION INTRAVENOUS at 23:02

## 2021-05-24 RX ADMIN — SODIUM CHLORIDE, POTASSIUM CHLORIDE, SODIUM LACTATE AND CALCIUM CHLORIDE: 600; 310; 30; 20 INJECTION, SOLUTION INTRAVENOUS at 18:19

## 2021-05-24 RX ADMIN — Medication 20 MG: at 07:39

## 2021-05-24 RX ADMIN — MIDAZOLAM 2 MG: 1 INJECTION INTRAMUSCULAR; INTRAVENOUS at 07:23

## 2021-05-24 RX ADMIN — LIDOCAINE HYDROCHLORIDE 100 MG: 20 INJECTION, SOLUTION EPIDURAL; INFILTRATION; INTRACAUDAL; PERINEURAL at 07:28

## 2021-05-24 RX ADMIN — Medication 100 MCG: at 07:56

## 2021-05-24 RX ADMIN — DEXAMETHASONE SODIUM PHOSPHATE 8 MG: 4 INJECTION, SOLUTION INTRAMUSCULAR; INTRAVENOUS at 07:39

## 2021-05-24 RX ADMIN — ONDANSETRON 4 MG: 2 INJECTION INTRAMUSCULAR; INTRAVENOUS at 07:39

## 2021-05-24 RX ADMIN — HYDROMORPHONE HYDROCHLORIDE 0.5 MG: 2 INJECTION, SOLUTION INTRAMUSCULAR; INTRAVENOUS; SUBCUTANEOUS at 09:47

## 2021-05-24 RX ADMIN — Medication 120 MG: at 07:29

## 2021-05-24 RX ADMIN — SODIUM CHLORIDE, POTASSIUM CHLORIDE, SODIUM LACTATE AND CALCIUM CHLORIDE: 600; 310; 30; 20 INJECTION, SOLUTION INTRAVENOUS at 09:48

## 2021-05-24 RX ADMIN — SODIUM CHLORIDE, POTASSIUM CHLORIDE, SODIUM LACTATE AND CALCIUM CHLORIDE: 600; 310; 30; 20 INJECTION, SOLUTION INTRAVENOUS at 13:36

## 2021-05-24 RX ADMIN — HYDROMORPHONE HYDROCHLORIDE 0.2 MG: 2 INJECTION, SOLUTION INTRAMUSCULAR; INTRAVENOUS; SUBCUTANEOUS at 09:12

## 2021-05-24 RX ADMIN — Medication 10 ML: at 13:38

## 2021-05-24 RX ADMIN — Medication 10 MG: at 08:09

## 2021-05-24 RX ADMIN — ROCURONIUM BROMIDE 30 MG: 10 INJECTION, SOLUTION INTRAVENOUS at 07:39

## 2021-05-24 RX ADMIN — SUGAMMADEX 200 MG: 100 INJECTION, SOLUTION INTRAVENOUS at 09:07

## 2021-05-24 RX ADMIN — MAGNESIUM SULFATE HEPTAHYDRATE 1 G: 500 INJECTION, SOLUTION INTRAMUSCULAR; INTRAVENOUS at 07:36

## 2021-05-24 RX ADMIN — Medication 50 MCG: at 08:01

## 2021-05-24 RX ADMIN — ROCURONIUM BROMIDE 10 MG: 10 INJECTION, SOLUTION INTRAVENOUS at 07:28

## 2021-05-24 RX ADMIN — FENTANYL CITRATE 50 MCG: 50 INJECTION, SOLUTION INTRAMUSCULAR; INTRAVENOUS at 07:53

## 2021-05-24 RX ADMIN — PANTOPRAZOLE SODIUM 40 MG: 40 INJECTION, POWDER, FOR SOLUTION INTRAVENOUS at 13:35

## 2021-05-24 ASSESSMENT — PULMONARY FUNCTION TESTS
PIF_VALUE: 29
PIF_VALUE: 28
PIF_VALUE: 25
PIF_VALUE: 29
PIF_VALUE: 22
PIF_VALUE: 25
PIF_VALUE: 26
PIF_VALUE: 22
PIF_VALUE: 1
PIF_VALUE: 29
PIF_VALUE: 28
PIF_VALUE: 24
PIF_VALUE: 28
PIF_VALUE: 27
PIF_VALUE: 21
PIF_VALUE: 26
PIF_VALUE: 29
PIF_VALUE: 29
PIF_VALUE: 27
PIF_VALUE: 20
PIF_VALUE: 28
PIF_VALUE: 28
PIF_VALUE: 21
PIF_VALUE: 1
PIF_VALUE: 28
PIF_VALUE: 29
PIF_VALUE: 20
PIF_VALUE: 20
PIF_VALUE: 29
PIF_VALUE: 29
PIF_VALUE: 25
PIF_VALUE: 28
PIF_VALUE: 26
PIF_VALUE: 0
PIF_VALUE: 29
PIF_VALUE: 23
PIF_VALUE: 1
PIF_VALUE: 29
PIF_VALUE: 29
PIF_VALUE: 25
PIF_VALUE: 29
PIF_VALUE: 24
PIF_VALUE: 20
PIF_VALUE: 27
PIF_VALUE: 27
PIF_VALUE: 28
PIF_VALUE: 23
PIF_VALUE: 1
PIF_VALUE: 3
PIF_VALUE: 25
PIF_VALUE: 21
PIF_VALUE: 29
PIF_VALUE: 28
PIF_VALUE: 21
PIF_VALUE: 28
PIF_VALUE: 29
PIF_VALUE: 29
PIF_VALUE: 26
PIF_VALUE: 29

## 2021-05-24 ASSESSMENT — PAIN SCALES - GENERAL
PAINLEVEL_OUTOF10: 7
PAINLEVEL_OUTOF10: 10

## 2021-05-24 ASSESSMENT — PAIN DESCRIPTION - FREQUENCY: FREQUENCY: INTERMITTENT

## 2021-05-24 ASSESSMENT — ENCOUNTER SYMPTOMS: SHORTNESS OF BREATH: 0

## 2021-05-24 ASSESSMENT — PAIN DESCRIPTION - DESCRIPTORS: DESCRIPTORS: ACHING

## 2021-05-24 ASSESSMENT — PAIN DESCRIPTION - LOCATION: LOCATION: ABDOMEN

## 2021-05-24 NOTE — PROGRESS NOTES
Incentive Spirometry education and demonstration completed by Respiratory Therapy Yes      Response to education: Excellent     Teaching Time: 5 minutes    Minimum Predicted Vital Capacity - 455 mL. Patient's Actual Vital Capacity - 800 mL. Turning over to Nursing for routine follow-up Yes.     Comments: goal met    Electronically signed by Bg Langford RCP on 5/24/2021 at 7:19 PM

## 2021-05-24 NOTE — PROGRESS NOTES
Pt. Sleeping, arousable, VSS, incisions intact, meets criteria for pacu discharge, awaiting room assignment

## 2021-05-24 NOTE — PLAN OF CARE
Problem: Falls - Risk of:  Goal: Will remain free from falls  Description: Will remain free from falls  Outcome: Ongoing  Goal: Absence of physical injury  Description: Absence of physical injury  Outcome: Ongoing     Problem: Falls - Risk of:  Goal: Will remain free from falls  Description: Will remain free from falls  Outcome: Ongoing     Problem: Falls - Risk of:  Goal: Absence of physical injury  Description: Absence of physical injury  Outcome: Ongoing     Problem: Pain:  Description: Pain management should include both nonpharmacologic and pharmacologic interventions. Goal: Pain level will decrease  Description: Pain level will decrease  Outcome: Ongoing  Goal: Control of acute pain  Description: Control of acute pain  Outcome: Ongoing  Goal: Control of chronic pain  Description: Control of chronic pain  Outcome: Ongoing     Problem: Pain:  Description: Pain management should include both nonpharmacologic and pharmacologic interventions. Goal: Pain level will decrease  Description: Pain level will decrease  Outcome: Ongoing     Problem: Pain:  Description: Pain management should include both nonpharmacologic and pharmacologic interventions. Goal: Control of acute pain  Description: Control of acute pain  Outcome: Ongoing     Problem: Pain:  Description: Pain management should include both nonpharmacologic and pharmacologic interventions. Goal: Control of chronic pain  Description: Control of chronic pain  Outcome: Ongoing     Problem: Discharge Planning:  Goal: Discharged to appropriate level of care  Description: Discharged to appropriate level of care  Outcome: Ongoing     Problem: Discharge Planning:  Goal: Discharged to appropriate level of care  Description: Discharged to appropriate level of care  Outcome: Ongoing     Problem:  Bowel Function - Altered:  Goal: Bowel elimination is within specified parameters  Description: Bowel elimination is within specified parameters  Outcome: Ongoing Problem:  Bowel Function - Altered:  Goal: Bowel elimination is within specified parameters  Description: Bowel elimination is within specified parameters  Outcome: Ongoing     Problem: Fluid Volume - Imbalance:  Goal: Ability to achieve a balanced intake and output will improve  Description: Ability to achieve a balanced intake and output will improve  Outcome: Ongoing     Problem: Infection - Surgical Site:  Goal: Will show no infection signs and symptoms  Description: Will show no infection signs and symptoms  Outcome: Ongoing  Goal: Ability to maintain a body temperature in the normal range will improve  Description: Ability to maintain a body temperature in the normal range will improve  Outcome: Ongoing     Problem: Infection - Surgical Site:  Goal: Will show no infection signs and symptoms  Description: Will show no infection signs and symptoms  Outcome: Ongoing     Problem: Infection - Surgical Site:  Goal: Will show no infection signs and symptoms  Description: Will show no infection signs and symptoms  Outcome: Ongoing  Goal: Ability to maintain a body temperature in the normal range will improve  Description: Ability to maintain a body temperature in the normal range will improve  Outcome: Ongoing     Problem: Infection - Surgical Site:  Goal: Will show no infection signs and symptoms  Description: Will show no infection signs and symptoms  Outcome: Ongoing     Problem: Infection - Surgical Site:  Goal: Ability to maintain a body temperature in the normal range will improve  Description: Ability to maintain a body temperature in the normal range will improve  Outcome: Ongoing     Problem: Nutrition Deficit:  Goal: Ability to identify appropriate dietary choices will improve  Description: Ability to identify appropriate dietary choices will improve  Outcome: Ongoing     Problem: Nutrition Deficit:  Goal: Ability to identify appropriate dietary choices will improve  Description: Ability to identify

## 2021-05-24 NOTE — OP NOTE
Operative Note      Patient: Reza Mccain  YOB: 1970  MRN: [id number]    Date of Procedure: 5/24/2021    [facility]) Physicians   [facility]  [address] [place] [address], [address] [place] [address]    [place] [id number] . Phone: [phone]  Fax: [phone]             Procedure Note    Indications: The patient was evaluated by our multidisciplinary team and was found to be a good candidate for weight loss surgery for future weight loss. Body mass index is 40.72 kg/m². Dc Gordillo Risks and benefits explained and Reza Mccain wants to proceed. Pre-operative Diagnosis:   Patient Active Problem List   Diagnosis    Obesity    Asthma    Migraine headache    Dysmenorrhea    Right knee pain    Back pain    Constipation    Anxiety    Depression    Insomnia    Need for Tdap vaccination    Morbid obesity with BMI of 40.0-44.9, adult (HCC)    Mixed hyperlipidemia    Chronic GERD    Chronic midline low back pain without sciatica    Hiatal hernia    Incarcerated incisional hernia         Post-operative Diagnosis:   Same      Procedure:    - Laparoscopic Sleeve Gastrectomy. - Laparoscopic 1ry Ventral Incisional hernia Repair. Incarcerated     - Laparoscopic Hiatal Hernia Repair. Surgeon: Holli Daugherty MD, FACS. Anesthesia: General endotracheal anesthesia        Procedure Details   The patient was seen again in the Holding Room. The risks, benefits, complications, treatment options, and expected outcomes were discussed with the patient and/or family. Including but not limited to; The possibilities of reaction to medication, pulmonary aspiration, perforation of viscus, bleeding, recurrent infection, strictures, leaks, failure to lose weight, regaining weight,  malnutrition, the need for additional procedures, failure to diagnose a condition, and creating a complication requiring transfusion or operation were discussed.  There was concurrence with the proposed plan and informed consent was obtained. The site of surgery was properly noted/marked. The patient was taken to Operating Room, identified as Ryanne Graves and the procedure verified as Laparoscopic Sleeve Gastrectomy & other indicated procedures. A Time Out was held and the above information confirmed. The patient was placed in the supine position and general anesthesia was induced, along with placement of orogastric tube, Venodyne boots. Lovenox SQ, Emend and IV Antibiotics given pre-operatively. All pressure points were padded properly. Patient prepped and draped in sterile fashion. A left upper quadrant incision was made and a veres needle was inserted after confirming with saline drop test.  After adequate pneumoperitoneum was obtained, a 5 mm trocar was inserted. This was followed by a endoscope which confirmed intra-abdominal placement and there was no injury on initial trocar placement. Additional ports were placed in the standard positions under direct vision. The abdomen was initially explored and noted multiple incisional hernia in the periumbilical and infraumbilical region with omentum incarcerated there. Also noted patient to have a hiatal hernia. .  A liver retractor was inserted through a small incision in the upper midline, lifted the liver upward, and was then secured to the OR table. The pylorus was identified and measurement was taken approximately 4-6 cm from the pylorus along the greater curvature of the stomach. The harmonic scalpel / ligasure was used to take down the attachments and short gastric vessels along the greater curve of the stomach. This was continued until all attachments were taken down and continued to the gastro-esophageal junction. A 34 Azeri dilator was placed along the lesser curvature and into the pylorus. A hiatal hernia was identified. Began the dissection by dividing the phrenoesophageal membrane anteriorly. The esophagus was freed anteriorly. That dissection was done to free &/or excise the hiatal hernia sac. The esophagus was now mobilized at the hiatus anteriorly, no posterior herniation nor defect. The GE junction and pad of fat now at least 2 cms into abdominal cavity. The crura was then closed anterior to the esophagus with multiple interrupted 0 ethibond. There was approximately 4 cm of Intraabdominal esophagus. Vagus nerves were protected and away from dissection. A stapler was fired along the dilator to create an appropriate sized gastric sleeve pouch. Purple followed Tan firings were used to create the sleeve. The staple line looked very healthy and no bleeding from staple line. The stomach was confirmed to be completely divided with uniform shape,  no twist in the sleeve and wide patent incisura. A 2-0 vicryl suture was used to over-sew and imbricate the sleeve staple line. The staple line was completely over-sewn. The dilator was removed and an Edlich tube was advanced across the sleeve to ensure patency mainly at incisura, then retracted to just above the GE junction. The stomach distal to the staple line was clamped and methylene blue saline was injected under pressure confirming No obstruction (flow noted to duodenum) and No leak. Patient has periumbilical and infraumbilical incarcerated incisional hernia, omentum was incarcerated there using the harmonic scalpel that was taken down and freed from the inside of the hernia as well to free the edges of the hernia. That was done safely without any bleeding. Through separate incisions, total of 5 ,  0.0 Ethibond stitches using suture passing device under direct visualization were used to close the defect. Pneumoperitoneum removed and stitches tied , then repair was inspected after re-applying pneumoperitoneum and it was intact. The abdomen was carefully inspected and there was no bleeding or any other abnormality.   The stomach was brought out through the RUQ incision. Hemostasis was confirmed. Snow applied along suture line and/or biopsy sites to ensure hemostasis. The 15 and 12 port sites was closed using 0.0 Vicryl  suture at the level of the fascia and that was done under direct vision with the laparoscope to ensure proper closure and nothing entrapped. Local Anesthetic used at port sites. The trocar site skin wounds were closed using 4.0 Vicryl after copious Irrigation of the wounds. Dermabond / or steri strips applied. Instrument, sponge, and needle counts were correct at the conclusion of the case. Findings:  As above. Estimated Blood Loss:  Minimal           Drains: none           Total IV Fluids: 1500 ml           Specimens: Stomach (Subtotal)            Complications:  None; patient tolerated the procedure well. Disposition: PACU - hemodynamically stable. Condition: stable    Attending Attestation: I was present and scrubbed for the entire procedure.               Electronically signed by Ligia Patrick MD on 5/24/2021 at 9:19 AM

## 2021-05-24 NOTE — ANESTHESIA PRE PROCEDURE
Department of Anesthesiology  Preprocedure Note       Name:  Tato Jackson   Age:  45 y.o.  :  1970                                          MRN:  [id number]         Date:  2021      Surgeon: Lupillo Cazares): John Beckwith MD    Procedure: Procedure(s):  LAPAROSCOPIC SLEEVE GASTRECTOMY - ETHICON    Medications prior to admission:   Prior to Admission medications    Medication Sig Start Date End Date Taking? Authorizing Provider   famotidine (PEPCID) 20 MG tablet Take 1 tablet by mouth daily 21   John Beckwith MD   amitriptyline (ELAVIL) 25 MG tablet Take 25 mg by mouth nightly 20   Historical Provider, MD   albuterol (PROVENTIL HFA) 108 (90 BASE) MCG/ACT inhaler Inhale 2 puffs into the lungs every 4 hours as needed for Wheezing or Shortness of Breath. 14   Sara Crouch MD       Current medications:    Current Facility-Administered Medications   Medication Dose Route Frequency Provider Last Rate Last Admin    lactated ringers infusion   Intravenous Continuous John Beckwith MD        lidocaine PF 1 % injection 0.5 mL  0.5 mL Intradermal Once John Beckwith MD        ceFAZolin (ANCEF) 2000 mg in dextrose 5 % 50 mL IVPB  2,000 mg Intravenous On Call to Nanette Lawrence MD        scopolamine (TRANSDERM-SCOP) transdermal patch 1 patch  1 patch Transdermal Once John Beckwith MD   1 patch at 21 0657       Allergies:     Allergies   Allergen Reactions    Amoxicillin Hives, Shortness Of Breath, Itching and Swelling     tongue swelling    Penicillins Hives, Shortness Of Breath, Itching and Swelling    Red Dye Hives, Itching and Swelling     The patient had a skin reaction to a red hair dye NOT oral medicines        Problem List:    Patient Active Problem List   Diagnosis Code    Obesity E66.9    Asthma J45.909    Migraine headache G43.909    Dysmenorrhea N94.6    Right knee pain M25.561    Back pain M54.9    Constipation K59.00    Anxiety F41.9    Depression F32.9  Insomnia G47.00    Need for Tdap vaccination Z23    Morbid obesity with BMI of 40.0-44.9, adult ([facility] [address] E66.01, Z68.41    Mixed hyperlipidemia E78.2    Chronic GERD K21.9    Chronic midline low back pain without sciatica M54.5, G89.29    Hiatal hernia K44.9       Past Medical History:        Diagnosis Date    Anxiety 2012    Asthma     Depression 2013    [facility]  and     while pregnant    Dysmenorrhea 10/14/2011    Insomnia 2013    Migraine headache 11/10/2010    Obesity 2010    Panic attacks 10/17/2012       Past Surgical History:        Procedure Laterality Date     SECTION  1991     SECTION  1995   [employer] ECTOPIC PREGNANCY SURGERY  2002    right salpingo-oopherectomy    ENDOMETRIAL ABLATION  2012    KNEE ARTHROSCOPY Right     Dr. Joseph Eaton Dr. [address] [place] [address]  1995    UPPER GASTROINTESTINAL ENDOSCOPY N/A 2021    EGD BIOPSY performed by Zully Orr MD at [address] [facility] [address],  [address] History:    Social History     Tobacco Use    Smoking status: Never Smoker    Smokeless tobacco: Never Used   Substance Use Topics    Alcohol use:  Yes     Alcohol/week: 1.0 standard drinks     Types: 1 Glasses of wine per week     Comment: occasional                                Counseling given: Not Answered      Vital Signs (Current):   Vitals:    21 1122 21 0626   BP:  127/88   Pulse:  82   Resp:  16   Temp:  96.7 °F (35.9 °C)   TempSrc:  Temporal   SpO2:  100%   Weight: 219 lb (99.3 kg) 201 lb 9.6 oz (91.4 kg)   Height: 4' 11\" (1.499 m)                                               BP Readings from Last 3 Encounters:   21 127/88   21 136/84   21 (!) 133/95       NPO Status: Time of last liquid consumption: 2100                        Time of last solid consumption: 2100                        Date of last liquid consumption: 05/23/21                        Date of last solid food consumption: 05/22/21    BMI:   Wt Readings from Last 3 Encounters:   05/24/21 201 lb 9.6 oz (91.4 kg)   04/20/21 213 lb 9.6 oz (96.9 kg)   03/09/21 213 lb (96.6 kg)     Body mass index is 40.72 kg/m².     CBC:   Lab Results   Component Value Date    WBC 4.4 05/18/2021    RBC 4.45 05/18/2021    HGB 12.6 05/18/2021    HCT 39.0 05/18/2021    MCV 87.7 05/18/2021    RDW 15.5 05/18/2021     05/18/2021       CMP:   Lab Results   Component Value Date     05/18/2021    K 4.2 05/18/2021     05/18/2021    CO2 22 05/18/2021    BUN 18 05/18/2021    CREATININE 0.8 05/18/2021    GFRAA >60 05/18/2021    GFRAA >60 08/15/2012    AGRATIO 1.4 05/18/2021    LABGLOM >60 05/18/2021    GLUCOSE 82 05/18/2021    PROT 7.8 05/18/2021    PROT 7.4 08/15/2012    CALCIUM 9.5 05/18/2021    BILITOT <0.2 05/18/2021    ALKPHOS 75 05/18/2021    AST 18 05/18/2021    ALT 14 05/18/2021       POC Tests:   Recent Labs     05/24/21  0645   POCGLU 83       Coags:   Lab Results   Component Value Date    PROTIME 11.9 02/10/2021    INR 1.03 02/10/2021       HCG (If Applicable):   Lab Results   Component Value Date    PREGTESTUR Negative 02/26/2021    HCG NEGATIVE 07/07/2011        ABGs: No results found for: PHART, PO2ART, AIU8MBB, SOC3FLH, BEART, E3BGVPJR     Type & Screen (If Applicable):  No results found for: LABABO, LABRH    Drug/Infectious Status (If Applicable):  No results found for: HIV, HEPCAB    COVID-19 Screening (If Applicable):   Lab Results   Component Value Date    COVID19 Not Detected 05/18/2021    [address] DETECTED 10/06/2020           Anesthesia Evaluation  Patient summary reviewed and Nursing notes reviewed no history of anesthetic complications:   Airway: Mallampati: I  TM distance: >3 FB   Neck ROM: full  Mouth opening: > = 3 FB Dental: normal exam   (+) caps      Pulmonary:   (+) asthma:     (-) shortness of breath                           Cardiovascular:        (-) hypertension and  angina                Neuro/Psych:   (+) headaches: migraine headaches, depression/anxiety    (-) CVA           GI/Hepatic/Renal:   (+) hiatal hernia, GERD:, morbid obesity     (-) liver disease       Endo/Other:        (-) diabetes mellitus, hypothyroidism               Abdominal:           Vascular:   + DVT, .  - PVD. Anesthesia Plan      general     ASA 3       Induction: intravenous. MIPS: Postoperative opioids intended and Prophylactic antiemetics administered. Anesthetic plan and risks discussed with patient. Use of blood products discussed with patient whom. Plan discussed with CRNA.                   Aurora Olguin MD   5/24/2021

## 2021-05-24 NOTE — PROGRESS NOTES
1:54 PM  Admission and assessment complete. Oriented patient to room and call light. The care plan and education has been reviewed and mutually agreed upon with the patient. 3:58 PM  Ambulated with patient in the hallway. Patient tolerated it well. Patient voided and assisted back to the bed with call light in reach. Will continue to monitor. 6:26 PM  Ambulated with patient in the hallway for the second time. Patient tolerated it well. Back in bed. Will continue to monitor.

## 2021-05-24 NOTE — ANESTHESIA POSTPROCEDURE EVALUATION
Department of Anesthesiology  Postprocedure Note    Patient: Elizabeth Cantu  MRN: [id number]  YOB: 1970  Date of evaluation: 5/24/2021  Time:  11:03 AM     Procedure Summary     Date: 05/24/21 Room / Location: [address]    Anesthesia Start: 8878 Anesthesia Stop: 0927    Procedures:       [address] (N/A Abdomen)      HERNIA HIATAL REPAIR- LAPAROSCOPIC (N/A Abdomen)      INCARCERATED INCISIONAL HERNIA REPAIR-LAPAROSCOPIC (N/A ) Diagnosis: (E66.01  MORBID OBESITY)    Surgeons: Mahesh Watt MD Responsible Provider: Lv Fair MD    Anesthesia Type: general ASA Status: 3          Anesthesia Type: general    Ledy Phase I: Ledy Score: 5    Ledy Phase II:      Last vitals: Reviewed and per EMR flowsheets.        Anesthesia Post Evaluation    Patient location during evaluation: PACU  Patient participation: complete - patient participated  Level of consciousness: awake  Airway patency: patent  Nausea & Vomiting: no nausea and no vomiting  Complications: no  Cardiovascular status: hemodynamically stable  Respiratory status: acceptable  Hydration status: stable

## 2021-05-24 NOTE — H&P
[facility]) Physicians   [facility]  [address] [place] [address], [address] [place] [id number] . Phone: [phone]  Fax: [phone]              Patient's History and Physical from May 13, 2020 was reviewed. Crispin Brown seen and examined. There has been no change. HISTORY OF PRESENT ILLNESS:    Reza Mccain is a very pleasant 45 y.o. with Body mass index is 40.72 kg/m². who is presenting for planned Laparoscopic Sleeve Gastrectomy for future weight loss. Past Medical History:        Diagnosis Date    Anxiety 2012    Asthma     Depression 2013    DVT of lower extremity, bilateral ([address]  and     while pregnant    Dysmenorrhea 10/14/2011    Insomnia 2013    Migraine headache 11/10/2010    Obesity 2010    Panic attacks 10/17/2012     Past Surgical History:        Procedure Laterality Date     SECTION  1991     SECTION  1995   Tivis Annmarie ECTOPIC PREGNANCY SURGERY  2002    right salpingo-oopherectomy    ENDOMETRIAL ABLATION  2012    KNEE ARTHROSCOPY Right     Dr. Saroj Crowe [address] [facility]  1995    UPPER GASTROINTESTINAL ENDOSCOPY N/A 2021    EGD BIOPSY performed by Arias Villa MD at [facility]     Medications Prior to Admission:   Medications Prior to Admission: LORazepam (ATIVAN) 1 MG tablet, Take 1 mg by mouth every 6 hours as needed for Anxiety. amitriptyline (ELAVIL) 25 MG tablet, Take 25 mg by mouth nightly  albuterol (PROVENTIL HFA) 108 (90 BASE) MCG/ACT inhaler, Inhale 2 puffs into the lungs every 4 hours as needed for Wheezing or Shortness of Breath.   famotidine (PEPCID) 20 MG tablet, Take 1 tablet by mouth daily    Allergies:  Amoxicillin, Penicillins, and Red dye    Social History:   TOBACCO: reports that she has never smoked. She has never used smokeless tobacco.  ETOH:   reports current alcohol use of about 1.0 standard drinks of alcohol per week. Family History:       Problem Relation Age of Onset    High Blood Pressure Brother     Substance Abuse Brother         alcohol    Diabetes Brother     Hypertension Brother     High Blood Pressure Brother     Substance Abuse Brother         alcohol    Diabetes Brother     Hypertension Brother     Substance Abuse Mother         smoker    Cancer Mother         lung cancer    Substance Abuse Father         alcohol    Cirrhosis Father     Other Sister         uterine fibroids    Other Sister         fibrocystic breast disease    Diabetes Sister     High Blood Pressure Sister     Hypertension Sister     Asthma Son          REVIEW OF SYSTEMS:    Review of Systems - History obtained from the patient  General ROS: obesity  Psychological ROS: negative  Ophthalmic ROS: negative  Neurological ROS: negative  ENT ROS: negative  Allergy and Immunology ROS: negative  Hematological and Lymphatic ROS: negative  Endocrine ROS: negative  Breast ROS: negative  Respiratory ROS: negative  Cardiovascular ROS: negative  Gastrointestinal ROS:negative  Genito-Urinary ROS: negative  Musculoskeletal ROS: negative   Skin ROS: negative      PHYSICAL EXAM:      /88   Pulse 82   Temp 96.7 °F (35.9 °C) (Temporal)   Resp 16   Ht 4' 11\" (1.499 m)   Wt 201 lb 9.6 oz (91.4 kg)   LMP 05/24/2012   SpO2 100%   BMI 40.72 kg/m²  I        Physical Exam   Vitals Reviewed   Constitutional: Patient is oriented to person, place, and time. Patient appears well-developed and well-nourished. Patient is active and cooperative. Non-toxic appearance. No distress. HENT:   Head: Normocephalic and atraumatic. Head is without abrasion and without laceration. Hair is normal.   Right Ear: External ear normal. No lacerations. No drainage, swelling .    Left Ear: External ear normal. No lacerations. No drainage, swelling. Nose: Nose normal. No nose lacerations or nasal deformity. Eyes: Conjunctivae, EOM and lids are normal. Right eye exhibits no discharge. No foreign body present in the right eye. Left eye exhibits no discharge. No foreign body present in the left eye. No scleral icterus. Neck: Trachea normal and normal range of motion. No JVD present. Pulmonary/Chest: Effort normal. No accessory muscle usage or stridor. No apnea. No respiratory distress. Cardiovascular: Normal rate and no JVD. Abdominal: Normal appearance. Patient exhibits no distension. Musculoskeletal: Normal range of motion. Patient exhibits no edema. Neurological: Patient is alert and oriented to person, place, and time. Patient has normal strength. GCS eye subscore is 4. GCS verbal subscore is 5. GCS motor subscore is 6. Skin: Skin is warm and dry. No abrasion and no rash noted. Patient is not diaphoretic. No cyanosis or erythema. Psychiatric: Patient has a normal mood and affect.  Speech is normal and behavior is normal. Cognition and memory are normal.       DATA:  CBC:   Lab Results   Component Value Date    WBC 4.4 05/18/2021    RBC 4.45 05/18/2021    HGB 12.6 05/18/2021    HCT 39.0 05/18/2021    MCV 87.7 05/18/2021    MCH 28.3 05/18/2021    MCHC 32.3 05/18/2021    RDW 15.5 05/18/2021     05/18/2021    MPV 7.6 05/18/2021     CMP:    Lab Results   Component Value Date     05/18/2021    K 4.2 05/18/2021     05/18/2021    CO2 22 05/18/2021    BUN 18 05/18/2021    CREATININE 0.8 05/18/2021    GFRAA >60 05/18/2021    GFRAA >60 08/15/2012    AGRATIO 1.4 05/18/2021    LABGLOM >60 05/18/2021    GLUCOSE 82 05/18/2021    PROT 7.8 05/18/2021    PROT 7.4 08/15/2012    LABALBU 4.5 05/18/2021    CALCIUM 9.5 05/18/2021    BILITOT <0.2 05/18/2021    ALKPHOS 75 05/18/2021    AST 18 05/18/2021    ALT 14 05/18/2021       ASSESSMENT AND PLAN:      Danny Barriga is a 45 y.o. female with Body mass index is 40.72 kg/m². ,  patient is deemed appropriate candidate for weight loss surgery by our multidisciplinary team.       Following The Metabolic and Bariatric Surgery Accreditation and Quality Improvement Program [facility]), and [employer] (ACS) recommendations, the Bariatric Surgical Risk/Benefit Calculator was used for [employer]. Report was discussed with [place] and patient wishes to proceed with surgery, fully understanding the risks and benefits. Of note, The [facility] Bariatric Surgical Risk/Benefit Calculator estimates the chance of an unfavorable outcome (such as a complication or death), the chance of remission of weight-related comorbidities, and the patient's BMI, weight change, and percent total weight change after surgery. These quantities are estimated based upon information the patient gives the healthcare provider about prior health history. The estimates are calculated using data from a large number of patients who had a primary bariatric surgical procedure similar to the one the patient may have. Please note the risk percentages, remission percentages, BMI, weight change, and percent total weight change provided to you by the risk/benefit calculator are only estimates. These estimates only take certain information into account. There may be other factors that are not included in the estimate which may increase or decrease the risk of a complication, the chance of remission of a weight-related comorbidity, or the amount of weight the patient loses. These estimates are not a guarantee of results. A complication after surgery may happen even if the risk is low, a weight-related comorbidity may not go into remission even if the chances are high, and a patient may lose more or less weight than predicted. This information is not intended to replace the advice of a doctor or healthcare provider about the diagnosis, treatment, or potential outcomes.  The Provider is not responsible for medical decisions that may be made by the patient based on the risk/benefit calculator estimates, since these estimates are provided for informational purposes. Patients should always consult their doctor or other health care provider before deciding on a treatment plan. Both open and laparoscopic approach were explained in details. Benefits and risks explained. Informed consent obtained. Risks including but not limited to; Infection, bleeding, gastric leak or obstruction, persistent nausea, vomiting, or reflux, injury to surrounding structures, risks of anesthesia, stricture, delayed gastric emptying, staple line leak, incisional hernia, malnutrition , hair loss, and/ or Conversion to Open surgery may be necessary. Failure to lose or maintain weight loss, Gallstones or Kidney Stones, Deep Venous Thrombosis , pulmonary embolism and / or death. With obesity and/or Fatty liver , I may elect to perform liver core biopsy to have  Baseline idea on liver pathology if there is abnormal Liver Functions or if there is hepatomegaly &/or lesion, risks include but not limited to bile leak, liver hematoma or failure to diagnose a condition. [place] understands that there may be a need to perform other urgent or necessary procedures that were unanticipated. Becerra Isabelle consent to the performance of any additional procedures determined during the original procedure to be in their best interests and where delay might cause additional harm or put patient at risk for additional anesthesia risk for required by a second procedure and that will be determined by the surgeon. I did explain thoroughly to the patient that compliance with pre- and post op diet and other recommendations are integral part to improve the chances of successful weight loss and also not following it could end with serious health complications.      We discussed that our goal is to ameliorate the medical problems and

## 2021-05-25 ENCOUNTER — APPOINTMENT (OUTPATIENT)
Dept: GENERAL RADIOLOGY | Age: 51
DRG: 620 | End: 2021-05-25
Attending: SURGERY
Payer: COMMERCIAL

## 2021-05-25 VITALS
RESPIRATION RATE: 16 BRPM | HEART RATE: 54 BPM | BODY MASS INDEX: 40.52 KG/M2 | HEIGHT: 59 IN | TEMPERATURE: 97.9 F | WEIGHT: 201 LBS | DIASTOLIC BLOOD PRESSURE: 82 MMHG | SYSTOLIC BLOOD PRESSURE: 124 MMHG | OXYGEN SATURATION: 98 %

## 2021-05-25 LAB
HCT VFR BLD CALC: 34.1 % (ref 36–48)
HEMOGLOBIN: 10.8 G/DL (ref 12–16)
MCH RBC QN AUTO: 28.1 PG (ref 26–34)
MCHC RBC AUTO-ENTMCNC: 31.8 G/DL (ref 31–36)
MCV RBC AUTO: 88.6 FL (ref 80–100)
PDW BLD-RTO: 15.6 % (ref 12.4–15.4)
PLATELET # BLD: 312 K/UL (ref 135–450)
PMV BLD AUTO: 7.8 FL (ref 5–10.5)
RBC # BLD: 3.85 M/UL (ref 4–5.2)
REASON FOR REJECTION: NORMAL
REJECTED TEST: NORMAL
WBC # BLD: 7.2 K/UL (ref 4–11)

## 2021-05-25 PROCEDURE — 85027 COMPLETE CBC AUTOMATED: CPT

## 2021-05-25 PROCEDURE — 2500000003 HC RX 250 WO HCPCS: Performed by: SURGERY

## 2021-05-25 PROCEDURE — 6360000004 HC RX CONTRAST MEDICATION

## 2021-05-25 PROCEDURE — 99024 POSTOP FOLLOW-UP VISIT: CPT | Performed by: NURSE PRACTITIONER

## 2021-05-25 PROCEDURE — 94761 N-INVAS EAR/PLS OXIMETRY MLT: CPT

## 2021-05-25 PROCEDURE — 74240 X-RAY XM UPR GI TRC 1CNTRST: CPT

## 2021-05-25 PROCEDURE — 6360000002 HC RX W HCPCS: Performed by: SURGERY

## 2021-05-25 PROCEDURE — 2580000003 HC RX 258: Performed by: SURGERY

## 2021-05-25 PROCEDURE — 36415 COLL VENOUS BLD VENIPUNCTURE: CPT

## 2021-05-25 PROCEDURE — 6370000000 HC RX 637 (ALT 250 FOR IP): Performed by: NURSE PRACTITIONER

## 2021-05-25 PROCEDURE — 6370000000 HC RX 637 (ALT 250 FOR IP): Performed by: SURGERY

## 2021-05-25 PROCEDURE — C9113 INJ PANTOPRAZOLE SODIUM, VIA: HCPCS | Performed by: SURGERY

## 2021-05-25 PROCEDURE — APPSS180 APP SPLIT SHARED TIME > 60 MINUTES: Performed by: NURSE PRACTITIONER

## 2021-05-25 RX ORDER — ONDANSETRON 4 MG/1
8 TABLET, ORALLY DISINTEGRATING ORAL EVERY 8 HOURS PRN
Qty: 30 TABLET | Refills: 0 | Status: SHIPPED | OUTPATIENT
Start: 2021-05-25 | End: 2021-06-07 | Stop reason: ALTCHOICE

## 2021-05-25 RX ORDER — OXYCODONE HYDROCHLORIDE AND ACETAMINOPHEN 5; 325 MG/1; MG/1
1 TABLET ORAL EVERY 6 HOURS PRN
Qty: 28 TABLET | Refills: 0 | Status: SHIPPED | OUTPATIENT
Start: 2021-05-25 | End: 2021-06-07

## 2021-05-25 RX ORDER — ONDANSETRON 8 MG/1
8 TABLET, ORALLY DISINTEGRATING ORAL EVERY 8 HOURS PRN
Status: DISCONTINUED | OUTPATIENT
Start: 2021-05-25 | End: 2021-05-25 | Stop reason: HOSPADM

## 2021-05-25 RX ORDER — OXYCODONE HYDROCHLORIDE AND ACETAMINOPHEN 5; 325 MG/1; MG/1
2 TABLET ORAL EVERY 4 HOURS PRN
Status: DISCONTINUED | OUTPATIENT
Start: 2021-05-25 | End: 2021-05-25 | Stop reason: HOSPADM

## 2021-05-25 RX ORDER — PROMETHAZINE HYDROCHLORIDE 25 MG/1
12.5 TABLET ORAL EVERY 6 HOURS PRN
Status: DISCONTINUED | OUTPATIENT
Start: 2021-05-25 | End: 2021-05-25 | Stop reason: HOSPADM

## 2021-05-25 RX ORDER — ONDANSETRON 4 MG/1
8 TABLET, ORALLY DISINTEGRATING ORAL EVERY 8 HOURS PRN
Qty: 30 TABLET | Refills: 1 | Status: SHIPPED | OUTPATIENT
Start: 2021-05-25 | End: 2021-06-07 | Stop reason: ALTCHOICE

## 2021-05-25 RX ORDER — OXYCODONE HYDROCHLORIDE AND ACETAMINOPHEN 5; 325 MG/1; MG/1
1 TABLET ORAL EVERY 4 HOURS PRN
Status: DISCONTINUED | OUTPATIENT
Start: 2021-05-25 | End: 2021-05-25 | Stop reason: HOSPADM

## 2021-05-25 RX ADMIN — SODIUM CHLORIDE, POTASSIUM CHLORIDE, SODIUM LACTATE AND CALCIUM CHLORIDE: 600; 310; 30; 20 INJECTION, SOLUTION INTRAVENOUS at 03:07

## 2021-05-25 RX ADMIN — ONDANSETRON 4 MG: 2 INJECTION INTRAMUSCULAR; INTRAVENOUS at 14:46

## 2021-05-25 RX ADMIN — CIPROFLOXACIN 400 MG: 2 INJECTION, SOLUTION INTRAVENOUS at 07:38

## 2021-05-25 RX ADMIN — SODIUM CHLORIDE, POTASSIUM CHLORIDE, SODIUM LACTATE AND CALCIUM CHLORIDE: 600; 310; 30; 20 INJECTION, SOLUTION INTRAVENOUS at 12:36

## 2021-05-25 RX ADMIN — Medication 10 ML: at 07:16

## 2021-05-25 RX ADMIN — IOHEXOL 50 ML: 350 INJECTION, SOLUTION INTRAVENOUS at 08:23

## 2021-05-25 RX ADMIN — OXYCODONE HYDROCHLORIDE AND ACETAMINOPHEN 2 TABLET: 5; 325 TABLET ORAL at 14:40

## 2021-05-25 RX ADMIN — ENOXAPARIN SODIUM 30 MG: 30 INJECTION SUBCUTANEOUS at 07:15

## 2021-05-25 RX ADMIN — OXYCODONE HYDROCHLORIDE AND ACETAMINOPHEN 2 TABLET: 5; 325 TABLET ORAL at 09:57

## 2021-05-25 RX ADMIN — PANTOPRAZOLE SODIUM 40 MG: 40 INJECTION, POWDER, FOR SOLUTION INTRAVENOUS at 07:15

## 2021-05-25 RX ADMIN — CLINDAMYCIN IN 5 PERCENT DEXTROSE 900 MG: 18 INJECTION, SOLUTION INTRAVENOUS at 06:25

## 2021-05-25 ASSESSMENT — PAIN DESCRIPTION - LOCATION
LOCATION: ABDOMEN
LOCATION: ABDOMEN

## 2021-05-25 ASSESSMENT — PAIN DESCRIPTION - PAIN TYPE
TYPE: SURGICAL PAIN

## 2021-05-25 ASSESSMENT — PAIN SCALES - GENERAL
PAINLEVEL_OUTOF10: 5
PAINLEVEL_OUTOF10: 7

## 2021-05-25 ASSESSMENT — PAIN DESCRIPTION - DESCRIPTORS
DESCRIPTORS: ACHING

## 2021-05-25 NOTE — PROGRESS NOTES
[facility]) Physicians   General & Laparoscopic Surgery  [facility]       Pt seen and examined    S/p laparoscopic sleeve gastrectomy, 1ry incarcerated incisional ventral hernia repair & hiatal hernia repair. The patient is ambulating in zendejas. Pain is well controlled. There is no nausea and/or vomiting. There are no other complaints or questions. Vitals:    05/25/21 0420 05/25/21 0615 05/25/21 0712 05/25/21 0850   BP: 130/78 116/76 124/86    Pulse: 71 76 70    Resp: 19 18 16 19   Temp: 97.4 °F (36.3 °C) 97.1 °F (36.2 °C) 99.1 °F (37.3 °C)    TempSrc: Infrared Infrared Oral    SpO2: 96% 96% 98% 97%   Weight:       Height:         Abdomen is soft, appropriately tender, incisions stable with no erythema. Breath sounds are clear bilaterally. Bowel sounds are hypoactive in all quadrants.     Data  CBC:   Lab Results   Component Value Date    WBC 7.2 05/25/2021    RBC 3.85 05/25/2021    HGB 10.8 05/25/2021    HCT 34.1 05/25/2021    MCV 88.6 05/25/2021    MCH 28.1 05/25/2021    MCHC 31.8 05/25/2021    RDW 15.6 05/25/2021     05/25/2021    MPV 7.8 05/25/2021     BMP:    Lab Results   Component Value Date     05/18/2021    K 4.2 05/18/2021     05/18/2021    CO2 17 05/25/2021    BUN 9 05/25/2021    LABALBU 4.5 05/18/2021    CREATININE 0.6 05/25/2021    CALCIUM 8.8 05/25/2021    GFRAA >60 05/25/2021    GFRAA >60 08/15/2012    LABGLOM >60 05/25/2021    GLUCOSE 86 05/25/2021     Current Inpatient Medications    Current Facility-Administered Medications: albuterol sulfate  (90 Base) MCG/ACT inhaler 2 puff, 2 puff, Inhalation, Q4H PRN  diphenhydrAMINE (BENADRYL) injection 12.5 mg, 12.5 mg, Intravenous, Q6H PRN  hydrALAZINE (APRESOLINE) injection 10 mg, 10 mg, Intravenous, Q2H PRN  lidocaine 4 % external patch 1 patch, 1 patch, Transdermal, Daily  hyoscyamine (LEVSIN) 500 MCG/ML injection 250 mcg, 250 mcg, Intravenous, Q4H PRN  ciprofloxacin (CIPRO) IVPB 400 mg, 400 mg, Intravenous, Q12H  HYDROmorphone HCl (DILAUDID) 0.2 mg/mL PCA 50 mL, , Intravenous, Continuous  naloxone (NARCAN) injection 0.4 mg, 0.4 mg, Intravenous, PRN  lactated ringers infusion, , Intravenous, Continuous  sodium chloride flush 0.9 % injection 10 mL, 10 mL, Intravenous, 2 times per day  sodium chloride flush 0.9 % injection 10 mL, 10 mL, Intravenous, PRN  0.9 % sodium chloride infusion, 25 mL, Intravenous, PRN  HYDROmorphone HCl PF (DILAUDID) injection 0.5 mg, 0.5 mg, Intravenous, Q3H PRN  ondansetron (ZOFRAN) injection 4 mg, 4 mg, Intravenous, Q6H PRN  scopolamine (TRANSDERM-SCOP) transdermal patch 1 patch, 1 patch, Transdermal, Q72H  promethazine (PHENERGAN) injection 6.25 mg, 6.25 mg, Intramuscular, Q6H PRN  metoclopramide (REGLAN) injection 10 mg, 10 mg, Intravenous, Q6H PRN  pantoprazole (PROTONIX) injection 40 mg, 40 mg, Intravenous, Daily **AND** sodium chloride (PF) 0.9 % injection 10 mL, 10 mL, Intravenous, Daily  enoxaparin (LOVENOX) injection 30 mg, 30 mg, Subcutaneous, 2 times per day    Patient Active Problem List   Diagnosis    Obesity    Asthma    Migraine headache    Dysmenorrhea    Right knee pain    Back pain    Constipation    Anxiety    Depression    Insomnia    Need for Tdap vaccination    Morbid obesity with BMI of 40.0-44.9, adult ([facility] [address]    Mixed hyperlipidemia    Chronic GERD    Chronic midline low back pain without sciatica    Hiatal hernia    Incarcerated incisional hernia    S/P laparoscopic sleeve gastrectomy     A/P  Sierra Cochran is a 45 y.o. female pod#1, s/p laparoscopic sleeve gastrectomy, 1ry incarcerated incisional ventral hernia repair & hiatal hernia repair. Stable overall. UGI with no leak/obstruction, will start on Phase I diet. Patient has voided postoperatively and urine output is WNL. Will continue to monitor. Will discontinue PCA and start oral pain medications.    Patient will continue to wear abdominal binder when walking for support. Patient needs to ambulate in the zendejas every 60-90 minutes. Patient will continue icing incisions. Plan for discharge today if nausea remains controlled, patient continues to void and is tolerating Phase I diet. Discussed with Dr. Stefany Bedoya and Nursing Staff.     OLE Amanda

## 2021-05-25 NOTE — PROGRESS NOTES
CLINICAL PHARMACY NOTE: MEDS TO BEDS    Total # of Prescriptions Filled: 2   The following medications were delivered to the patient:  · Percocet 5-325  · Zofran 4mg    Additional Documentation:    Delivered to Patient spouse  Reynaldo Escudero CPhT

## 2021-05-25 NOTE — PROGRESS NOTES
8:18 AM  Morning assessment complete. Patient denies any nausea over night. No flatus yet. Positive BS. Ambulating frequently in the hallway. IS use encouraged. Pain controlled with pain medication. Patient taken to upper GI. The care plan and education has been reviewed and mutually agreed upon with the patient. Abdominal binder in place. 9:44 AM  Patient started on phase one diet. PCA pump stopped. Patient started on oral pain meds. Will continue to monitor. 3:54 PM  Patient tolerating her diet. Mild nausea. Zofran given. Pain controlled with pain medication. 4:41 PM  Reviewed discharge instructions with patient. Patient verbalized understanding and denies any question. Scripts delivered by pharmacy. IV removed without any complications. 5:31 PM  Patient discharged to home with all belongings.

## 2021-05-25 NOTE — PLAN OF CARE
Problem: Falls - Risk of:  Goal: Will remain free from falls  Description: Will remain free from falls  Outcome: Ongoing  Goal: Absence of physical injury  Description: Absence of physical injury  Outcome: Ongoing     Problem: Pain:  Goal: Pain level will decrease  Description: Pain level will decrease  Outcome: Ongoing  Goal: Control of acute pain  Description: Control of acute pain  Outcome: Ongoing  Goal: Control of chronic pain  Description: Control of chronic pain  Outcome: Ongoing     Problem: Pain:  Goal: Pain level will decrease  Description: Pain level will decrease  Outcome: Ongoing  Goal: Control of acute pain  Description: Control of acute pain  Outcome: Ongoing  Goal: Control of chronic pain  Description: Control of chronic pain  Outcome: Ongoing     Pain assessed using 0-10 scale, patient able to voice pain level and states pain improved with prn medication administered.

## 2021-05-25 NOTE — PLAN OF CARE
Problem: Falls - Risk of:  Goal: Will remain free from falls  Description: Will remain free from falls  5/25/2021 1427 by Royce Hooper RN  Outcome: Ongoing  5/25/2021 0749 by Marina Hodgkin, RN  Outcome: Ongoing  Goal: Absence of physical injury  Description: Absence of physical injury  5/25/2021 1427 by Royce Hooper RN  Outcome: Ongoing  5/25/2021 0749 by Marina Hodgkin, RN  Outcome: Ongoing     Problem: Falls - Risk of:  Goal: Will remain free from falls  Description: Will remain free from falls  5/25/2021 1427 by Royce Hooper RN  Outcome: Ongoing  5/25/2021 0749 by Marina Hodgkin, RN  Outcome: Ongoing     Problem: Falls - Risk of:  Goal: Absence of physical injury  Description: Absence of physical injury  5/25/2021 1427 by Royce Hooper RN  Outcome: Ongoing  5/25/2021 0749 by Marina Hodgkin, RN  Outcome: Ongoing     Problem: Pain:  Description: Pain management should include both nonpharmacologic and pharmacologic interventions. Goal: Pain level will decrease  Description: Pain level will decrease  5/25/2021 1427 by Royce Hooper RN  Outcome: Ongoing  5/25/2021 0749 by Marina Hodgkin, RN  Outcome: Ongoing  Goal: Control of acute pain  Description: Control of acute pain  5/25/2021 1427 by Royce Hooper RN  Outcome: Ongoing  5/25/2021 0749 by Marina Hodgkin, RN  Outcome: Ongoing  Goal: Control of chronic pain  Description: Control of chronic pain  5/25/2021 1427 by Royce Hooper RN  Outcome: Ongoing  5/25/2021 0749 by Marina Hodgkin, RN  Outcome: Ongoing     Problem: Pain:  Description: Pain management should include both nonpharmacologic and pharmacologic interventions. Goal: Pain level will decrease  Description: Pain level will decrease  5/25/2021 1427 by Royce Hooper RN  Outcome: Ongoing  5/25/2021 0749 by Marina Hodgkin, RN  Outcome: Ongoing     Problem: Pain:  Description: Pain management should include both nonpharmacologic and pharmacologic interventions.   Goal: Control of acute Blair Hampton RN  Outcome: Ongoing  5/25/2021 0749 by Macario Mackay RN  Outcome: Ongoing  Goal: Ability to maintain a body temperature in the normal range will improve  Description: Ability to maintain a body temperature in the normal range will improve  5/25/2021 1427 by Elham Barbosa RN  Outcome: Ongoing  5/25/2021 0749 by Macario Mackay RN  Outcome: Ongoing     Problem: Infection - Surgical Site:  Goal: Will show no infection signs and symptoms  Description: Will show no infection signs and symptoms  5/25/2021 1427 by Elham Barbosa RN  Outcome: Ongoing  5/25/2021 0749 by Macario Mackay RN  Outcome: Ongoing     Problem: Infection - Surgical Site:  Goal: Ability to maintain a body temperature in the normal range will improve  Description: Ability to maintain a body temperature in the normal range will improve  5/25/2021 1427 by Elham Barbosa RN  Outcome: Ongoing  5/25/2021 0749 by Macario Mackay RN  Outcome: Ongoing     Problem: Nutrition Deficit:  Goal: Ability to identify appropriate dietary choices will improve  Description: Ability to identify appropriate dietary choices will improve  5/25/2021 1427 by Elham Barbosa RN  Outcome: Ongoing  5/25/2021 0749 by Macario Mackay RN  Outcome: Ongoing     Problem: Nutrition Deficit:  Goal: Ability to identify appropriate dietary choices will improve  Description: Ability to identify appropriate dietary choices will improve  5/25/2021 1427 by Elham Barbosa RN  Outcome: Ongoing  5/25/2021 0749 by Macario Mackay RN  Outcome: Ongoing     Problem: Venous Thromboembolism:  Goal: Will show no signs or symptoms of venous thromboembolism  Description: Will show no signs or symptoms of venous thromboembolism  5/25/2021 1427 by Elham Barbosa RN  Outcome: Ongoing  5/25/2021 0749 by Macario Mackay RN  Outcome: Ongoing  Goal: Absence of signs or symptoms of impaired coagulation  Description: Absence of signs or symptoms of impaired coagulation  5/25/2021 1427 by Dacia Barger Hakeem Eric RN  Outcome: Ongoing  5/25/2021 0749 by Jeffrey Ellis RN  Outcome: Ongoing     Problem: Venous Thromboembolism:  Goal: Will show no signs or symptoms of venous thromboembolism  Description: Will show no signs or symptoms of venous thromboembolism  5/25/2021 1427 by Pino Smith RN  Outcome: Ongoing  5/25/2021 0749 by Jeffrey Ellis RN  Outcome: Ongoing     Problem: Venous Thromboembolism:  Goal: Absence of signs or symptoms of impaired coagulation  Description: Absence of signs or symptoms of impaired coagulation  5/25/2021 1427 by Pino Smith RN  Outcome: Ongoing  5/25/2021 0749 by Jeffrey Ellis RN  Outcome: Ongoing     Problem: Activity Intolerance:  Goal: Ability to perform activities of daily living will improve  Description: Ability to perform activities of daily living will improve  Outcome: Ongoing     Problem:  Activity Intolerance:  Goal: Ability to perform activities of daily living will improve  Description: Ability to perform activities of daily living will improve  Outcome: Ongoing

## 2021-05-25 NOTE — PLAN OF CARE
Problem: Falls - Risk of:  Goal: Will remain free from falls  Description: Will remain free from falls  5/25/2021 1427 by Oscar Locke RN  Outcome: Completed  5/25/2021 1427 by Oscar Locke RN  Outcome: Ongoing  5/25/2021 0749 by Bill Enrique RN  Outcome: Ongoing     Problem: Falls - Risk of:  Goal: Absence of physical injury  Description: Absence of physical injury  5/25/2021 1427 by Oscar Locke RN  Outcome: Completed  5/25/2021 1427 by Oscar Locke RN  Outcome: Ongoing  5/25/2021 0749 by Bill Enrique RN  Outcome: Ongoing     Problem: Pain:  Description: Pain management should include both nonpharmacologic and pharmacologic interventions. Goal: Pain level will decrease  Description: Pain level will decrease  5/25/2021 1427 by Oscar Locke RN  Outcome: Completed  5/25/2021 1427 by Oscar Locke RN  Outcome: Ongoing  5/25/2021 0749 by Bill Enrique RN  Outcome: Ongoing  Goal: Control of acute pain  Description: Control of acute pain  5/25/2021 1427 by Oscar Locke RN  Outcome: Completed  5/25/2021 1427 by Oscar Locke RN  Outcome: Ongoing  5/25/2021 0749 by Bill Enrique RN  Outcome: Ongoing  Goal: Control of chronic pain  Description: Control of chronic pain  5/25/2021 1427 by Oscar Locke RN  Outcome: Completed  5/25/2021 1427 by Oscar Locke RN  Outcome: Ongoing  5/25/2021 0749 by Bill Enrique RN  Outcome: Ongoing     Problem: Pain:  Description: Pain management should include both nonpharmacologic and pharmacologic interventions. Goal: Pain level will decrease  Description: Pain level will decrease  5/25/2021 1427 by Oscar Locke RN  Outcome: Completed  5/25/2021 1427 by Oscar Locke RN  Outcome: Ongoing  5/25/2021 0749 by Bill Enrique RN  Outcome: Ongoing     Problem: Pain:  Description: Pain management should include both nonpharmacologic and pharmacologic interventions.   Goal: Control of acute pain  Description: Control of acute pain  5/25/2021 1427 by Darnell Garcia RN  Outcome: Completed  5/25/2021 1427 by Darnell Garcia RN  Outcome: Ongoing  5/25/2021 0749 by Jayme Cuadra RN  Outcome: Ongoing     Problem: Pain:  Description: Pain management should include both nonpharmacologic and pharmacologic interventions.   Goal: Control of chronic pain  Description: Control of chronic pain  5/25/2021 1427 by Darnell Garcia RN  Outcome: Completed  5/25/2021 1427 by Darnell Garcia RN  Outcome: Ongoing  5/25/2021 0749 by Jayme Cuadra RN  Outcome: Ongoing     Problem: Infection - Surgical Site:  Goal: Will show no infection signs and symptoms  Description: Will show no infection signs and symptoms  5/25/2021 1427 by Darnell Garcia RN  Outcome: Completed  5/25/2021 1427 by Darnell Garcia RN  Outcome: Ongoing  5/25/2021 0749 by Jayme Cuadra RN  Outcome: Ongoing  Goal: Ability to maintain a body temperature in the normal range will improve  Description: Ability to maintain a body temperature in the normal range will improve  5/25/2021 1427 by Darnell Garcia RN  Outcome: Completed  5/25/2021 1427 by Darnell Garcia RN  Outcome: Ongoing  5/25/2021 0749 by Jayme Cuadra RN  Outcome: Ongoing     Problem: Infection - Surgical Site:  Goal: Will show no infection signs and symptoms  Description: Will show no infection signs and symptoms  5/25/2021 1427 by Darnell Garcia RN  Outcome: Completed  5/25/2021 1427 by Darnell Garcia RN  Outcome: Ongoing  5/25/2021 0749 by Jayme Cuadra RN  Outcome: Ongoing     Problem: Infection - Surgical Site:  Goal: Ability to maintain a body temperature in the normal range will improve  Description: Ability to maintain a body temperature in the normal range will improve  5/25/2021 1427 by Darnell Garcia RN  Outcome: Completed  5/25/2021 1427 by Darnell Garcia RN  Outcome: Ongoing  5/25/2021 0749 by Jayme Cuadra RN  Outcome: Ongoing     Problem: Fluid Volume - Imbalance:  Goal: Ability to achieve a balanced intake and

## 2021-05-25 NOTE — PROGRESS NOTES
2059: Shift assessment complete. Meds given per MAR. Pt took meds without difficulty. Pt alert and orientated x4  Dressing: to abdomen CDi  Drains: Dilaudid PCA pump on, pt reports adequate pain control at this time. Diet: Npo for upper GI in AM  Patient expressed no other needs at this time, will continue to educate patient as needed. Significant other at bedside. Staying the night. The care plan and education has been reviewed and mutually agreed upon with the patient.

## 2021-05-27 ENCOUNTER — TELEPHONE (OUTPATIENT)
Dept: BARIATRICS/WEIGHT MGMT | Age: 51
End: 2021-05-27

## 2021-05-27 DIAGNOSIS — G89.18 POST-OPERATIVE PAIN: Primary | ICD-10-CM

## 2021-05-27 DIAGNOSIS — Z98.84 S/P LAPAROSCOPIC SLEEVE GASTRECTOMY: ICD-10-CM

## 2021-05-27 RX ORDER — CELECOXIB 200 MG/1
200 CAPSULE ORAL DAILY
Qty: 7 CAPSULE | Refills: 0 | Status: SHIPPED | OUTPATIENT
Start: 2021-05-27 | End: 2021-06-07

## 2021-06-01 ENCOUNTER — TELEPHONE (OUTPATIENT)
Dept: BARIATRICS/WEIGHT MGMT | Age: 51
End: 2021-06-01

## 2021-06-01 ASSESSMENT — ENCOUNTER SYMPTOMS
RESPIRATORY NEGATIVE: 1
ALLERGIC/IMMUNOLOGIC NEGATIVE: 1
EYES NEGATIVE: 1
GASTROINTESTINAL NEGATIVE: 1
ROS SKIN COMMENTS: ABDOMINAL SURGICAL INCISIONS.

## 2021-06-01 NOTE — TELEPHONE ENCOUNTER
I attempted to contact the patient to follow up with them regarding their recent surgery. I have left a voicemail for the patient to return our call. If I am not in office when she returns my call, please have them speak with a dietician. Thanks!

## 2021-06-01 NOTE — PROGRESS NOTES
Hypertension Brother     High Blood Pressure Brother     Substance Abuse Brother         alcohol    Diabetes Brother     Hypertension Brother     Substance Abuse Mother         smoker    Cancer Mother         lung cancer    Substance Abuse Father         alcohol    Cirrhosis Father     Other Sister         uterine fibroids    Other Sister         fibrocystic breast disease    Diabetes Sister     High Blood Pressure Sister     Hypertension Sister     Asthma Son      Social History     Tobacco Use    Smoking status: Never Smoker    Smokeless tobacco: Never Used   Substance Use Topics    Alcohol use: Yes     Alcohol/week: 1.0 standard drinks     Types: 1 Glasses of wine per week     Comment: occasional     I counseled the patient on the importance of not smoking and risks of ETOH. Allergies   Allergen Reactions    Amoxicillin Hives, Shortness Of Breath, Itching and Swelling     tongue swelling    Penicillins Hives, Shortness Of Breath, Itching and Swelling    Red Dye Hives, Itching and Swelling     The patient had a skin reaction to a red hair dye NOT oral medicines     Bee Venom Swelling     Vitals:    06/07/21 0933   BP: 108/66   Pulse: 76   Resp: 16   Weight: 194 lb 9.6 oz (88.3 kg)   Height: 4' 11\" (1.499 m)     Body mass index is 39.3 kg/m². Current Outpatient Medications:     Multiple Vitamins-Minerals (BARIATRIC FUSION PO), Take 4 tablets by mouth daily, Disp: , Rfl:     LORazepam (ATIVAN) 1 MG tablet, Take 1 mg by mouth every 6 hours as needed for Anxiety. , Disp: , Rfl:     famotidine (PEPCID) 20 MG tablet, Take 1 tablet by mouth daily, Disp: 60 tablet, Rfl: 3    amitriptyline (ELAVIL) 25 MG tablet, Take 25 mg by mouth nightly, Disp: , Rfl:     albuterol (PROVENTIL HFA) 108 (90 BASE) MCG/ACT inhaler, Inhale 2 puffs into the lungs every 4 hours as needed for Wheezing or Shortness of Breath., Disp: 1 Inhaler, Rfl: 12    Lab Results   Component Value Date    WBC 7.2 05/25/2021    RBC Negative. Objective:   Physical Exam  Vitals reviewed. Constitutional:       Appearance: She is well-developed. HENT:      Head: Normocephalic and atraumatic. Eyes:      Conjunctiva/sclera: Conjunctivae normal.      Pupils: Pupils are equal, round, and reactive to light. Cardiovascular:      Rate and Rhythm: Normal rate. Pulmonary:      Effort: Pulmonary effort is normal.   Abdominal:      Palpations: Abdomen is soft. Musculoskeletal:         General: Normal range of motion. Cervical back: Normal range of motion and neck supple. Skin:     General: Skin is warm and dry. Comments: Surgical incisions healing well   Neurological:      Mental Status: She is alert and oriented to person, place, and time. Psychiatric:         Behavior: Behavior normal.         Thought Content: Thought content normal.         Judgment: Judgment normal.       Assessment and Plan:   Patient is 2 weeks s/p laparoscopic sleeve gastrectomy, 1ry incarcerated incisional ventral hernia repair & hiatal hernia repair, down 19 lbs. The patient's current Body mass index is 39.3 kg/m². (6/7/21). She is doing well, denies n/v/dysphagia or reflux. She is tolerating diet, getting adequate fluids and protein. Taking vitamins as recommended. She did meet with the registered dietitian for continued follow up. I agree with recommendations and plan. She is walking, encouraged continued physical activity. Bowels and bladder functioning. Patient may begin to take pills whole again, but take only one at a time and allow a minute or so in between each one. Incisions healing well, c/d/i. Continue no heavy lifting or submersion in pools or tubs for 6 weeks after surgery. Patient did not have abdominal binder on today and was having discomfort at hernia repair site. Recommended wearing binder up to 6 weeks out for hernia repair.     Pathology Results:      FINAL DIAGNOSIS:     Stomach, partial gastrectomy:   - Histologically unremarkable gastric tissue.   - No Helicobacter microorganisms identified.   - Negative for intestinal metaplasia, dysplasia or malignancy. Review and discussed pathology with patient. We will see her back in 4 weeks for continued follow up.

## 2021-06-07 ENCOUNTER — OFFICE VISIT (OUTPATIENT)
Dept: BARIATRICS/WEIGHT MGMT | Age: 51
End: 2021-06-07

## 2021-06-07 VITALS
HEART RATE: 76 BPM | DIASTOLIC BLOOD PRESSURE: 66 MMHG | SYSTOLIC BLOOD PRESSURE: 108 MMHG | WEIGHT: 194.6 LBS | HEIGHT: 59 IN | BODY MASS INDEX: 39.23 KG/M2 | RESPIRATION RATE: 16 BRPM

## 2021-06-07 DIAGNOSIS — K21.9 CHRONIC GERD: ICD-10-CM

## 2021-06-07 DIAGNOSIS — E78.2 MIXED HYPERLIPIDEMIA: Primary | ICD-10-CM

## 2021-06-07 DIAGNOSIS — E66.01 SEVERE OBESITY (BMI 35.0-35.9 WITH COMORBIDITY) (HCC): ICD-10-CM

## 2021-06-07 DIAGNOSIS — Z98.84 S/P LAPAROSCOPIC SLEEVE GASTRECTOMY: ICD-10-CM

## 2021-06-07 PROCEDURE — 99024 POSTOP FOLLOW-UP VISIT: CPT | Performed by: NURSE PRACTITIONER

## 2021-06-07 NOTE — PROGRESS NOTES
Dietary Assessment Note      Vitals:   Vitals:    21 0933   BP: 108/66   Pulse: 76   Resp: 16   Weight: 194 lb 9.6 oz (88.3 kg)   Height: 4' 11\" (1.499 m)    Patient lost 19 lbs over past 6 weeks. Total Weight Loss: 22 lbs    Labs reviewed:     Results for Freda Gómez (MRN [id number]) as of 2021 09:33   Ref. Range 2021 09:57   Sodium Latest Ref Range: 136 - 145 mmol/L 137   Potassium Latest Ref Range: 3.5 - 5.1 mmol/L 4.2   Chloride Latest Ref Range: 99 - 110 mmol/L 103   CO2 Latest Ref Range: 21 - 32 mmol/L 22   BUN Latest Ref Range: 7 - 20 mg/dL 18   Creatinine Latest Ref Range: 0.6 - 1.1 mg/dL 0.8   Anion Gap Latest Ref Range: 3 - 16  12   GFR Non- Latest Ref Range: >60  >60   GFR  Latest Ref Range: >60  >60   Glucose Latest Ref Range: 70 - 99 mg/dL 82   Calcium Latest Ref Range: 8.3 - 10.6 mg/dL 9.5   Total Protein Latest Ref Range: 6.4 - 8.2 g/dL 7.8       Protein intake: >80 grams/day     Fluid intake: 48 oz     Multivitamin/mineral intake: 4 fusion - takes all at the same time     Calcium intake: none     Other: none     Exercise: Walking     Nutrition Assessment: 2 week s/p sleeve post-op visit. Breakfast: Nectar/Unjury made with 8 oz water (2 scoops)     Snack: 2 oz of eggs     Lunch: tuna with ff light james with pickle juice     Snack: nothing     Dinner:  tuna with ff light james with pickle juice     Snack: Nectar/Unjury made with 8 oz water (2 scoops)     Consuming only full liquid/Pureed foods?  Yes     Fluids: water, margarito    Amount able to eat per sittin oz     Following /30 rule: takes 30 minutes to eat her food     Food Intolerances/issues: Yes    Client Concerns: none     Goals:   Phase 3 handout provided and reviewed for pt to start at 3 weeks post op   Reduce scoops to 2 scoops/day   Take 2 MVI in morning and 2 in evening     Plan: F/U at 6 weeks    Markus Khan, RD, LD

## 2021-06-23 ENCOUNTER — TELEPHONE (OUTPATIENT)
Dept: BARIATRICS/WEIGHT MGMT | Age: 51
End: 2021-06-23

## 2021-06-23 NOTE — TELEPHONE ENCOUNTER
Called the patient she denies anything strenuous however she felt pain around the periumbilical region. This is where the patient had incisional hernia repair. Patient denies any other associated symptoms. .  Patient is passing gas and having bowel movements. Patient denies any nausea vomiting fever or chills. Patient denies any drainage from the wound. Reassured the patient that this is most probably is musculoskeletal in nature she should apply the binder she still have some pain medicine left should take it and apply also heating pads if that does not work then the patient will call us and we can call some Celebrex for her.

## 2021-07-06 ENCOUNTER — OFFICE VISIT (OUTPATIENT)
Dept: BARIATRICS/WEIGHT MGMT | Age: 51
End: 2021-07-06

## 2021-07-06 VITALS
SYSTOLIC BLOOD PRESSURE: 129 MMHG | RESPIRATION RATE: 18 BRPM | HEART RATE: 83 BPM | OXYGEN SATURATION: 98 % | BODY MASS INDEX: 37.7 KG/M2 | WEIGHT: 187 LBS | HEIGHT: 59 IN | DIASTOLIC BLOOD PRESSURE: 83 MMHG

## 2021-07-06 DIAGNOSIS — K43.0 INCARCERATED INCISIONAL HERNIA: ICD-10-CM

## 2021-07-06 DIAGNOSIS — E66.01 SEVERE OBESITY (BMI 35.0-35.9 WITH COMORBIDITY) (HCC): ICD-10-CM

## 2021-07-06 DIAGNOSIS — M54.50 CHRONIC MIDLINE LOW BACK PAIN WITHOUT SCIATICA: ICD-10-CM

## 2021-07-06 DIAGNOSIS — G89.29 CHRONIC MIDLINE LOW BACK PAIN WITHOUT SCIATICA: ICD-10-CM

## 2021-07-06 DIAGNOSIS — E78.2 MIXED HYPERLIPIDEMIA: ICD-10-CM

## 2021-07-06 DIAGNOSIS — K21.9 CHRONIC GERD: ICD-10-CM

## 2021-07-06 DIAGNOSIS — Z98.84 S/P LAPAROSCOPIC SLEEVE GASTRECTOMY: Primary | ICD-10-CM

## 2021-07-06 PROCEDURE — 99024 POSTOP FOLLOW-UP VISIT: CPT | Performed by: SURGERY

## 2021-07-06 NOTE — PROGRESS NOTES
Dietary Assessment Note      Vitals:   Vitals:    21 1008   BP: 129/83   Pulse: 83   Resp: 18   SpO2: 98%   Weight: 187 lb (84.8 kg)   Height: 4' 11\" (1.499 m)    Patient lost 7.6 lbs over the past 4 weeks. Total Weight Loss: 29.6 lbs    Labs reviewed: no lab studies available for review at time of visit    Protein intake: 60-80 grams/day     Fluid intake: 48-64 oz/day    Multivitamin/mineral intake: yes 4 Fusion per day    Calcium intake: no    Other: n/a    Exercise: none organized yet    Nutrition Assessment: Brkst is 2 hb eggs; snack is protein shake with water/almond milk; lunch is chix wrapped in lettuce; snack is shaved lunch meat or cashews or devilled eggs; dinner is salmon or chix.      Amount able to eat per sittinoz    Following  rule: yes    Food Intolerances/issues: eggs sometimes    Client Concerns: no real concerns    Goals: advance to Phase 4    Plan: follow up as needed    Jonah Machado, RD, LD

## 2021-07-06 NOTE — PROGRESS NOTES
The patient is a 45 y.o. female who returns today for follow up.    Caro Leyva is s/p     Laparoscopic sleeve gastrectomy    We discussed how her weight affects her overall health including:  Patient Active Problem List   Diagnosis    Severe obesity (BMI 35.0-35.9 with comorbidity) ([address]    Asthma    Migraine headache    Dysmenorrhea    Right knee pain    Back pain    Constipation    Anxiety    Depression    Insomnia    Need for Tdap vaccination    Mixed hyperlipidemia    Chronic GERD    Chronic midline low back pain without sciatica    Hiatal hernia    Incarcerated incisional hernia    S/P laparoscopic sleeve gastrectomy        Vitals:    07/06/21 1008   BP: 129/83   Pulse: 83   Resp: 18   SpO2: 98%   Weight: 187 lb (84.8 kg)   Height: 4' 11\" (1.499 m)       Lab Results   Component Value Date    WBC 7.2 05/25/2021    RBC 3.85 05/25/2021    HGB 10.8 05/25/2021    HCT 34.1 05/25/2021    MCV 88.6 05/25/2021    MCH 28.1 05/25/2021    MCHC 31.8 05/25/2021    MPV 7.8 05/25/2021    NEUTOPHILPCT 50.7 02/10/2021    LYMPHOPCT 41.3 02/10/2021    MONOPCT 4.7 02/10/2021    EOSRELPCT 2.7 02/10/2021    BASOPCT 0.6 02/10/2021    NEUTROABS 2.3 02/10/2021    LYMPHSABS 1.9 02/10/2021    MONOSABS 0.2 02/10/2021    EOSABS 0.1 02/10/2021     Lab Results   Component Value Date     05/18/2021    K 4.2 05/18/2021     05/18/2021    CO2 22 05/18/2021    ANIONGAP 12 05/18/2021    GLUCOSE 82 05/18/2021    BUN 18 05/18/2021    CREATININE 0.8 05/18/2021    LABGLOM >60 05/18/2021    GFRAA >60 05/18/2021    GFRAA >60 08/15/2012    CALCIUM 9.5 05/18/2021    PROT 7.8 05/18/2021    PROT 7.4 08/15/2012    LABALBU 4.5 05/18/2021    AGRATIO 1.4 05/18/2021    BILITOT <0.2 05/18/2021    ALKPHOS 75 05/18/2021    ALT 14 05/18/2021    AST 18 05/18/2021    GLOB 3.3 05/18/2021     Lab Results   Component Value Date    CHOL 261 02/10/2021    TRIG 68 02/10/2021    HDL 71 02/10/2021    LDLCALC 176 02/10/2021    LABVLDL 14 02/10/2021     Lab Results   Component Value Date    TSHREFLEX 2.47 02/10/2021     Lab Results   Component Value Date    IRON 47 02/10/2021    TIBC 308 02/10/2021    LABIRON 15 02/10/2021     Lab Results   Component Value Date    RJBPFBKT18 1376 02/10/2021    FOLATE 18.55 02/10/2021     Lab Results   Component Value Date    VITD25 32.8 02/10/2021     Lab Results   Component Value Date    LABA1C 4.9 02/10/2021    EAG 93.9 02/10/2021        The patient's current Body mass index is 37.77 kg/m². (7/6/21). Since her last visit she has lost 7.6 lbs since last visit and total of 29.6 lbs. Bhavik Mosquera underwent dietary counseling, and I have reviewed and agree with the dietary counseling, and I have reviewed and agree with the diet plan. There are no changes in the patients medical history or physical exam.     Denies nausea, vomiting, fevers, chills, hiccups, shoulder pain, heartburn, dysphagia wound drainage/bulge nor change in color around incision. Bowels working ok and making urine. The incisions healing well. Overall I'm really pleased with Bhavik Mosquera recovery. Pathology results were discussed with the patient. Bhavik Mosquera advised to sign  release form for utilizing the 3 months complimentary membership in the [employer] starting after 6 weeks post op. I did explain thoroughly to the patient that compliance with  post op diet and other recommendations are integral part to improve the chances of successful weight loss and also not following it could end with serious health complications. I advised Loree to gradually advance activity and  to call if there are any questions or concerns. Bhavik Mosquera will follow up in 8 weeks. Please note that some or all of this report was generated using voice recognition software. Please notify me in case of any questions about the content of this document, as some errors in transcription may have occurred .

## 2021-07-07 LAB
CHOLESTEROL, TOTAL: 187 MG/DL (ref 0–199)
GLUCOSE BLD-MCNC: 81 MG/DL (ref 70–99)
HDLC SERPL-MCNC: 47 MG/DL (ref 40–60)
LDL CHOLESTEROL CALCULATED: 124 MG/DL
TRIGL SERPL-MCNC: 82 MG/DL (ref 0–150)

## 2021-09-02 ENCOUNTER — OFFICE VISIT (OUTPATIENT)
Dept: BARIATRICS/WEIGHT MGMT | Age: 51
End: 2021-09-02
Payer: COMMERCIAL

## 2021-09-02 VITALS
OXYGEN SATURATION: 98 % | HEART RATE: 89 BPM | RESPIRATION RATE: 18 BRPM | SYSTOLIC BLOOD PRESSURE: 122 MMHG | BODY MASS INDEX: 33.87 KG/M2 | WEIGHT: 168 LBS | DIASTOLIC BLOOD PRESSURE: 78 MMHG | HEIGHT: 59 IN

## 2021-09-02 DIAGNOSIS — K43.0 INCARCERATED INCISIONAL HERNIA: ICD-10-CM

## 2021-09-02 DIAGNOSIS — E66.9 OBESITY (BMI 30.0-34.9): ICD-10-CM

## 2021-09-02 DIAGNOSIS — G89.29 CHRONIC MIDLINE LOW BACK PAIN WITHOUT SCIATICA: ICD-10-CM

## 2021-09-02 DIAGNOSIS — K44.9 HIATAL HERNIA: ICD-10-CM

## 2021-09-02 DIAGNOSIS — E78.2 MIXED HYPERLIPIDEMIA: ICD-10-CM

## 2021-09-02 DIAGNOSIS — Z98.84 S/P LAPAROSCOPIC SLEEVE GASTRECTOMY: Primary | ICD-10-CM

## 2021-09-02 DIAGNOSIS — M54.50 CHRONIC MIDLINE LOW BACK PAIN WITHOUT SCIATICA: ICD-10-CM

## 2021-09-02 DIAGNOSIS — K21.9 CHRONIC GERD: ICD-10-CM

## 2021-09-02 PROBLEM — E66.811 OBESITY (BMI 30.0-34.9): Status: ACTIVE | Noted: 2021-09-02

## 2021-09-02 PROCEDURE — 99214 OFFICE O/P EST MOD 30 MIN: CPT | Performed by: SURGERY

## 2021-09-02 NOTE — PROGRESS NOTES
[facility]) Physicians   [employer]  Obed Pittman MD, [address] [facility], [address] [place] [address]    [place]  [address] . Phone: [phone]  Fax: [phone]            Chief Complaint   Patient presents with   [address]  Follow Up     4mo s/p sleeve 21           HPI:    Homero Guillaume is a very pleasant 45 y.o.  female , Body mass index is 33.93 kg/m². Heddy  And multiple medical problems who is presenting for bariatric follow up care. Varun Stratton is s/p laparoscopic sleeve gastrectomy by me 2021. Initial Weight: 216.6 lbs, Weight Loss: 48.6 lbs. Comes today to the clinic without any complaints. Patient denies any nausea, vomiting, fevers, chills, shortness of breath, chest pain, constipation or urinary symptoms. Denies any heartburn nor dysphagia. Varun Stratton is feeling very well, and is very active. Patient is very pleased with the weight loss and resolution of co-morbid conditions.       Pain Assessment   Denies any abdominal pain     Past Medical History:   Diagnosis Date    Anxiety 2012    Asthma     Depression 2013    DVT of lower extremity, bilateral ([address]  and     while pregnant    Dysmenorrhea 10/14/2011    Insomnia 2013    Migraine headache 11/10/2010    Obesity 2010    Panic attacks 10/17/2012     Past Surgical History:   Procedure Laterality Date     SECTION  1991     SECTION  1995   Lisa Curl ECTOPIC PREGNANCY SURGERY  2002    right salpingo-oopherectomy    ENDOMETRIAL ABLATION  2012    HIATAL HERNIA REPAIR N/A 2021    HERNIA HIATAL REPAIR- LAPAROSCOPIC performed by Maggy Quintana MD at [address] [place] [address] Right     Dr. Jayshree Eaton Dr. [address] [facility] N/A 2021    LAPAROSCOPIC SLEEVE GASTRECTOMY - ETHICON performed by Justin Locke MD at [facility]  December 1995    UPPER GASTROINTESTINAL ENDOSCOPY N/A 2/26/2021    EGD BIOPSY performed by Justin Locke MD at [place] [address] N/A 5/24/2021    INCARCERATED INCISIONAL HERNIA REPAIR-LAPAROSCOPIC performed by Justin Locke MD at [address] [place] [address],[address] History   Problem Relation Age of Onset    High Blood Pressure Brother     Substance Abuse Brother         alcohol    Diabetes Brother     Hypertension Brother     High Blood Pressure Brother     Substance Abuse Brother         alcohol    Diabetes Brother     Hypertension Brother     Substance Abuse Mother         smoker    Cancer Mother         lung cancer    Substance Abuse Father         alcohol    Cirrhosis Father     Other Sister         uterine fibroids    Other Sister         fibrocystic breast disease    Diabetes Sister     High Blood Pressure Sister     Hypertension Sister     Asthma Son      Social History     Tobacco Use    Smoking status: Never Smoker    Smokeless tobacco: Never Used   Substance Use Topics    Alcohol use: Yes     Alcohol/week: 1.0 standard drinks     Types: 1 Glasses of wine per week     Comment: occasional     I counseled the patient on the importance of not smoking and risks of ETOH. Allergies   Allergen Reactions    Amoxicillin Hives, Shortness Of Breath, Itching and Swelling     tongue swelling    Penicillins Hives, Shortness Of Breath, Itching and Swelling    Red Dye Hives, Itching and Swelling     The patient had a skin reaction to a red hair dye NOT oral medicines     Bee Venom Swelling     Vitals:    09/02/21 0959   BP: 122/78   Pulse: 89   Resp: 18   SpO2: 98%   Weight: 168 lb (76.2 kg)   Height: 4' 11\" (1.499 m)       Body mass index is 33.93 kg/m².       Lab Results   Component Value Date    WBC 7.2 05/25/2021    RBC 3.85 05/25/2021    HGB 10.8 05/25/2021    HCT 34.1 05/25/2021    MCV 88.6 05/25/2021 MCH 28.1 05/25/2021    MCHC 31.8 05/25/2021    MPV 7.8 05/25/2021    NEUTOPHILPCT 50.7 02/10/2021    LYMPHOPCT 41.3 02/10/2021    MONOPCT 4.7 02/10/2021    EOSRELPCT 2.7 02/10/2021    BASOPCT 0.6 02/10/2021    NEUTROABS 2.3 02/10/2021    LYMPHSABS 1.9 02/10/2021    MONOSABS 0.2 02/10/2021    EOSABS 0.1 02/10/2021     Lab Results   Component Value Date     05/18/2021    K 4.2 05/18/2021     05/18/2021    CO2 22 05/18/2021    ANIONGAP 12 05/18/2021    GLUCOSE 81 07/07/2021    BUN 18 05/18/2021    CREATININE 0.8 05/18/2021    LABGLOM >60 05/18/2021    GFRAA >60 05/18/2021    GFRAA >60 08/15/2012    CALCIUM 9.5 05/18/2021    PROT 7.8 05/18/2021    PROT 7.4 08/15/2012    LABALBU 4.5 05/18/2021    AGRATIO 1.4 05/18/2021    BILITOT <0.2 05/18/2021    ALKPHOS 75 05/18/2021    ALT 14 05/18/2021    AST 18 05/18/2021    GLOB 3.3 05/18/2021     Lab Results   Component Value Date    CHOL 187 07/07/2021    TRIG 82 07/07/2021    HDL 47 07/07/2021    LDLCALC 124 07/07/2021    LABVLDL 14 02/10/2021     Lab Results   Component Value Date    TSHREFLEX 2.47 02/10/2021     Lab Results   Component Value Date    IRON 47 02/10/2021    TIBC 308 02/10/2021    LABIRON 15 02/10/2021     Lab Results   Component Value Date    QROCMGBE73 1376 02/10/2021    FOLATE 18.55 02/10/2021     Lab Results   Component Value Date    VITD25 32.8 02/10/2021     Lab Results   Component Value Date    LABA1C 4.9 02/10/2021    EAG 93.9 02/10/2021         Current Outpatient Medications:     Multiple Vitamins-Minerals (BARIATRIC FUSION PO), Take 4 tablets by mouth daily, Disp: , Rfl:     LORazepam (ATIVAN) 1 MG tablet, Take 1 mg by mouth every 6 hours as needed for Anxiety. , Disp: , Rfl:     famotidine (PEPCID) 20 MG tablet, Take 1 tablet by mouth daily, Disp: 60 tablet, Rfl: 3    amitriptyline (ELAVIL) 25 MG tablet, Take 25 mg by mouth nightly, Disp: , Rfl:     albuterol (PROVENTIL HFA) 108 (90 BASE) MCG/ACT inhaler, Inhale 2 puffs into the lungs every 4 hours as needed for Wheezing or Shortness of Breath., Disp: 1 Inhaler, Rfl: 12      Review of Systems - History obtained from the patient  General ROS: negative  Psychological ROS: negative  Ophthalmic ROS: negative  Neurological ROS: negative  ENT ROS: negative  Allergy and Immunology ROS: negative  Hematological and Lymphatic ROS: negative  Endocrine ROS: negative  Breast ROS: negative  Respiratory ROS: negative  Cardiovascular ROS: negative  Gastrointestinal ROS:negative  Genito-Urinary ROS: negative  Musculoskeletal ROS: negative   Skin ROS: excess skin     Physical Exam   Vitals Reviewed   Constitutional: Patient is oriented to person, place, and time. Patient appears well-developed and well-nourished. Patient is active and cooperative. Non-toxic appearance. No distress. HENT:   Head: Normocephalic and atraumatic. Head is without abrasion and without laceration. Hair is normal.   Right Ear: External ear normal. No lacerations. No drainage, swelling . Left Ear: External ear normal. No lacerations. No drainage, swelling. Mouth / Nose: face mask in place  Eyes: Conjunctivae, EOM and lids are normal. Right eye exhibits no discharge. No foreign body present in the right eye. Left eye exhibits no discharge. No foreign body present in the left eye. No scleral icterus. Neck: Trachea normal and normal range of motion. No JVD present. Pulmonary/Chest: Effort normal. No accessory muscle usage or stridor. No apnea. No respiratory distress. Cardiovascular: Normal rate and no JVD. Abdominal: Normal appearance. Patient exhibits no distension. Abdomen is soft, obese, non tender. Musculoskeletal: Normal range of motion. Patient exhibits no edema. Neurological: Patient is alert and oriented to person, place, and time. Patient has normal strength. GCS eye subscore is 4. GCS verbal subscore is 5. GCS motor subscore is 6. Skin: Skin is warm and dry. No abrasion and no rash noted.  Patient is not diaphoretic. No cyanosis or erythema. Psychiatric: Patient has a normal mood and affect. Speech is normal and behavior is normal. Cognition and memory are normal.       A/P:    Clark Jones was seen today for bariatrics post op follow up. Diagnoses and all orders for this visit:    S/P laparoscopic sleeve gastrectomy    Mixed hyperlipidemia    Incarcerated incisional hernia    Hiatal hernia    Chronic midline low back pain without sciatica    Chronic GERD    Obesity (BMI 30.0-34. 9)          Enrique Melton is 45 y.o. female , now with Body mass index is 33.93 kg/m². s/p Sleeve gastrectomy, has lost 19 lbs since last visit, total of 48.6 lbs weight loss. The patient underwent dietary counseling with registered dietician. I have reviewed, discussed and agree with the dietary plan. Patient is trying hard to keep good dietary and behavior modifications. Patient is monitoring portion sizes, food choices and liquid calories. Patient is trying to exercise regularly. Patient pleased with the surgery outcomes. I encouraged the patient to continue exercise and keeping healthy eating habits. Also counseled the patient extensively on post surgery care. Total encounter time: 30 minutes, including any number of the following: Bariatric Post operative work up/protocols, review of labs, imaging, provider notes, outside hospital records, performing examination/evaluation, counseling patient and/or family, ordering medications/tests, placing referrals and communication with referring physicians, coordination of care; discussing dietary plan/recall with the patient as well with registered dietitian and documentation in the EHR. Of note, the above was done during same day of the actual patient encounter. Clark Jones is here for her 4-month status post sleeve gastrectomy. Patient doing very well. Lost another 19 pounds. Staying very active. No complaints other than Skin irritation with excess skin. Advised the patient to follow-up with her PCP. We will see the patient back in 2 months for continued follow-up. Obesity as a disease is considered a high risk to patients overall health and should therefore be considered a high risk disease state. Advised the patient that not getting there weight under control, which hopefully would help with getting some of the comorbidities under control. That could increase risk of complications/worsening of those conditions on the long-term. Now with Covid-19 pandemic, CDC and health authorities does classify obese patients as vulnerable and high risk as well. Which makes weight loss a priority for improvement of their wellbeing and overall health. We discussed how her excess weight affects her overall health and importance of weight loss, healthy diet and active lifestyle to alleviate those co morbid conditions, otherwise risk deterioration.       - RTC in 2 months           Patient advised that its their responsibility to follow up for care, studies, referrals and/or labs ordered today. Please note that some or all of this report was generated using voice recognition software. Please notify me in case of any questions about the content of this document, as some errors in transcription may have occurred .

## 2021-09-02 NOTE — PROGRESS NOTES
Dietary Assessment Note      Vitals:   Vitals:    09/02/21 0959   BP: 122/78   Pulse: 89   Resp: 18   SpO2: 98%   Weight: 168 lb (76.2 kg)   Height: 4' 11\" (1.499 m)    Patient lost 19 lbs over the past 12 weeks. Total Weight Loss: 48.6 lbs    Labs reviewed: no lab studies available for review at time of visit    Protein intake: 60-80 grams/day     Fluid intake: 48-64 oz/day    Multivitamin/mineral intake: yes 4 Fusion per day    Calcium intake: no    Other: n/a    Exercise: yes [employer] - [employer] 3x week, water aerobics class    Nutrition Assessment: Brkst is scrambled eggs with onions and peppers, 2 slices turkey hedrick; snack is 1/2 apple; lunch is turkey sausage with broccoli and cauliflower;lite yogurt for snack; dinner is baked chix wings 4 with no skin or turkey breast or salmon, green beans.   Pt drinks protein shake Nectar or Premier protein 5x week     Amount able to eat per sitting: 3-4oz    Following 30/30/30 rule: yes    Food Intolerances/issues: crab legs, steak    Client Concerns: no real concerns    Goals: continue meal plan as presented    Plan: follow up as needed    Ruth Slade RD, LD

## 2021-09-18 ENCOUNTER — HOSPITAL ENCOUNTER (EMERGENCY)
Age: 51
Discharge: LEFT BEFORE DISCHARGE | End: 2021-09-18
Attending: EMERGENCY MEDICINE
Payer: COMMERCIAL

## 2021-09-18 VITALS
DIASTOLIC BLOOD PRESSURE: 98 MMHG | TEMPERATURE: 98.5 F | WEIGHT: 168 LBS | SYSTOLIC BLOOD PRESSURE: 150 MMHG | HEIGHT: 59 IN | BODY MASS INDEX: 33.87 KG/M2 | RESPIRATION RATE: 24 BRPM

## 2021-09-18 DIAGNOSIS — F43.0 ACUTE STRESS REACTION: Primary | ICD-10-CM

## 2021-09-18 PROCEDURE — 99282 EMERGENCY DEPT VISIT SF MDM: CPT

## 2021-09-18 PROCEDURE — 36415 COLL VENOUS BLD VENIPUNCTURE: CPT

## 2021-09-18 PROCEDURE — 93005 ELECTROCARDIOGRAM TRACING: CPT | Performed by: EMERGENCY MEDICINE

## 2021-09-18 RX ORDER — HYDROXYZINE HYDROCHLORIDE 50 MG/ML
50 INJECTION, SOLUTION INTRAMUSCULAR ONCE
Status: DISCONTINUED | OUTPATIENT
Start: 2021-09-18 | End: 2021-09-18 | Stop reason: HOSPADM

## 2021-09-18 ASSESSMENT — ENCOUNTER SYMPTOMS
VOMITING: 0
NAUSEA: 0
RHINORRHEA: 0
COUGH: 0
ABDOMINAL PAIN: 0
DIARRHEA: 0
SHORTNESS OF BREATH: 0
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
## 2021-09-18 NOTE — ED PROVIDER NOTES
The patient eloped before I could see her. In doing so, she unilaterally ended the patient physician relationship. I did not perform an exam or take a history.       Aj Carpenter MD  09/18/21 4311

## 2021-09-18 NOTE — ED NOTES
Attempted to take the patient to CT scan. 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 She refused to remove her jewelery for CT scan.        Pedro Gruber RN  09/18/21 141

## 2021-09-19 LAB
EKG ATRIAL RATE: 68 BPM
EKG DIAGNOSIS: NORMAL
EKG P AXIS: 53 DEGREES
EKG P-R INTERVAL: 140 MS
EKG Q-T INTERVAL: 424 MS
EKG QRS DURATION: 74 MS
EKG QTC CALCULATION (BAZETT): 450 MS
EKG R AXIS: 0 DEGREES
EKG T AXIS: 9 DEGREES
EKG VENTRICULAR RATE: 68 BPM

## 2021-09-19 PROCEDURE — 93010 ELECTROCARDIOGRAM REPORT: CPT | Performed by: INTERNAL MEDICINE

## 2021-11-18 ASSESSMENT — ENCOUNTER SYMPTOMS
COUGH: 0
ALLERGIC/IMMUNOLOGIC NEGATIVE: 1
SHORTNESS OF BREATH: 0
GASTROINTESTINAL NEGATIVE: 1
RESPIRATORY NEGATIVE: 1
EYES NEGATIVE: 1

## 2021-11-18 NOTE — PATIENT INSTRUCTIONS
Diet tips to help make you successful postoperatively    Eating habits after surgery will need to be a long-term change. Eating habits are so ingrained that it can be difficult to change so having made these changes for surgery is a great accomplishment. It is important to maintain these new eating habits after surgery. Also, remember that overall health, age, and genetics make each person's weight loss progress different. Do not compare your progress, the amount you eat, or exercise to other patients.  Protein first at every meal- Eat the protein portion of your meal first. Eating protein helps the body feel full and sends a signal to stop eating. Protein is very important in building tissue in the body.  Eat at least 4 times per day- This includes protein supplements and small meals with a high amount of protein   Chewing your food thoroughly- Eating too quickly and improper chewing can cause pain and vomiting after surgery.  Slowing down the speed at which you eat- Refill your fork only after you swallow. Adopt a new pattern of eating by taking a bite of food and putting your utensil down between bites. This will help to reduce the feeling of food being stuck.    Drink water and other fluids slowly- Drink at least 48 ounces per day minimum. Sip fluids as if they were hot beverages. If you find it difficult to stop gulping liquids, try using a sippy cup or a sport top water bottle.  Make sure you are eating meals without drinking fluids- After surgery you will not be allowed to drink fluids 30 minutes before, during, or 30 minutes after your meal (30/30/30 rule). This will be a life-long behavior change. The reason for the rule is to keep food from passing through your smaller stomach more rapidly.  This will cause you to feel hungry again shortly after your meal.   Continue to avoid caffeine and carbonated beverages- Caffeine acts as a diuretic and can be dehydrating as well as irritating to the lining of the stomach. Carbonated beverages release gas and can expand the stomach.  Continue to keep temptation from your kitchen- Keep your pantry and kitchen cabinets cleaned out of those dangerous foods that might tempt you after surgery (chips, cookies, candy, etc.).  Continue to increase your exercise program- Increase your daily physical activity. Aim for 5-6 days per week for 30 minutes. Walking is an easy way to get started with exercising. You want exercise to be a regular part of your life after surgery.  Make sure you have a good support system- There will be many changes and adjustments to make after surgery. It is important to have a supportive friend, family member or co-worker, etc. with whom you can talk. Continue to attend [facility]) Weight Management support groups as they can be helpful in maintaining behaviors. In addition, it is the responsibility of the patient to schedule and follow up on labs and tests completed after surgery. Results will be reviewed at each visit. Patient received dietary handouts and education.

## 2021-11-18 NOTE — PROGRESS NOTES
[facility]) Physicians   [employer]    Subjective:      Patient ID: Cata Crawford is a 45 y.o. female    HPI    7 months s/p sleeve gastrectomy    Cata Crawford is a 45 y.o. obese female , Body mass index is 31.14 kg/m². Patient denies any nausea, vomiting, fevers, chills, shortness of breath, chest pain, constipation or urinary symptoms. Denies any heartburn nor dysphagia.     Past Medical History:   Diagnosis Date    Anxiety 2012    Asthma     Depression 2013    DVT of lower extremity, bilateral ([address]  and     while pregnant    Dysmenorrhea 10/14/2011    Insomnia 2013    Migraine headache 11/10/2010    Obesity 2010    Panic attacks 10/17/2012     Past Surgical History:   Procedure Laterality Date     SECTION  1991     SECTION  1995    ECTOPIC PREGNANCY SURGERY  2002    right salpingo-oopherectomy    ENDOMETRIAL ABLATION  2012    HIATAL HERNIA REPAIR N/A 2021    HERNIA HIATAL REPAIR- LAPAROSCOPIC performed by Cali Akbar MD at [address] [place] [address] Right     Dr. Anastasia Anaya [address] [facility] N/A 2021    LAPAROSCOPIC SLEEVE GASTRECTOMY - ETHICON performed by Cali Akbar MD at [address] [place] [address]  1995    UPPER GASTROINTESTINAL ENDOSCOPY N/A 2021    EGD BIOPSY performed by Cali Akbar MD at [place] [address] N/A 2021    INCARCERATED INCISIONAL HERNIA REPAIR-LAPAROSCOPIC performed by Cali Akbar MD at [address] [place] [address],[address] [facility] History   Problem Relation Age of Onset    High Blood Pressure Brother     Substance Abuse Brother         alcohol    Diabetes Brother     Hypertension Brother     High Blood Pressure Brother     Substance Abuse Brother alcohol    Diabetes Brother     Hypertension Brother     Substance Abuse Mother         smoker    Cancer Mother         lung cancer    Substance Abuse Father         alcohol    Cirrhosis Father     Other Sister         uterine fibroids    Other Sister         fibrocystic breast disease    Diabetes Sister     High Blood Pressure Sister     Hypertension Sister     Asthma Son      Social History     Tobacco Use    Smoking status: Never Smoker    Smokeless tobacco: Never Used   Substance Use Topics    Alcohol use: Yes     Alcohol/week: 1.0 standard drink     Types: 1 Glasses of wine per week     Comment: occasional     I counseled the patient on the importance of not smoking and risks of ETOH. Allergies   Allergen Reactions    Amoxicillin Hives, Shortness Of Breath, Itching and Swelling     tongue swelling    Penicillins Hives, Shortness Of Breath, Itching and Swelling    Red Dye Hives, Itching and Swelling     The patient had a skin reaction to a red hair dye NOT oral medicines     Bee Venom Swelling     Vitals:    11/22/21 0928   BP: 137/76   Pulse: 63   Weight: 154 lb 3.2 oz (69.9 kg)   Height: 4' 11\" (1.499 m)     Body mass index is 31.14 kg/m². Current Outpatient Medications:     Multiple Vitamins-Minerals (BARIATRIC FUSION PO), Take 4 tablets by mouth daily, Disp: , Rfl:     LORazepam (ATIVAN) 1 MG tablet, Take 1 mg by mouth every 6 hours as needed for Anxiety. , Disp: , Rfl:     famotidine (PEPCID) 20 MG tablet, Take 1 tablet by mouth daily, Disp: 60 tablet, Rfl: 3    amitriptyline (ELAVIL) 25 MG tablet, Take 25 mg by mouth nightly, Disp: , Rfl:     albuterol (PROVENTIL HFA) 108 (90 BASE) MCG/ACT inhaler, Inhale 2 puffs into the lungs every 4 hours as needed for Wheezing or Shortness of Breath., Disp: 1 Inhaler, Rfl: 12    Lab Results   Component Value Date    WBC 7.2 05/25/2021    RBC 3.85 05/25/2021    HGB 10.8 05/25/2021    HCT 34.1 05/25/2021    MCV 88.6 05/25/2021    MCH 28.1 Exam  Vitals reviewed. Constitutional:       Appearance: She is well-developed. HENT:      Head: Normocephalic and atraumatic. Eyes:      Conjunctiva/sclera: Conjunctivae normal.      Pupils: Pupils are equal, round, and reactive to light. Cardiovascular:      Rate and Rhythm: Normal rate. Pulmonary:      Effort: Pulmonary effort is normal.   Abdominal:      Palpations: Abdomen is soft. Musculoskeletal:         General: Normal range of motion. Cervical back: Normal range of motion and neck supple. Skin:     General: Skin is warm and dry. Neurological:      Mental Status: She is alert and oriented to person, place, and time. Psychiatric:         Behavior: Behavior normal.         Thought Content: Thought content normal.         Judgment: Judgment normal.       Assessment and Plan:   Patient is 7 months s/p sleeve gastrectomy, down 13.8 lbs with a total weight loss of 62.4 lbs. The patient's current Body mass index is 31.14 kg/m². (11/22/21). She is doing well, denies n/v/dysphagia or reflux. She is tolerating diet, getting adequate fluids and protein. Discussed watching the alcohol as liquid calories can add up quickly. She is not exercising currently, but looking to restart now that her migraines are resolving. Encouraged physical activity as able. Patient was asking about getting one month of [facility] reinstated as she was unable to complete the three months due to her frequent migraines. Explained to patient that I will reach out to [facility] and see what I can do. Will let her know when I hear back. She is taking vitamins as instructed. She did meet with the registered dietitian for continued follow up. Discussed with dietitian and I agree with recommendations and plan. We will see her back in 2 months for continued follow up. Follow up labs ordered and patient is responsible for completing.  Patient gives verbal consent for me to leave message about results at phone number in chart or send My Chart message. Hyperlipidemia:   [x] Continue to make dietary and lifestyle modifications per our recommendations. .  [] Continue to follow up with their PCP for medication management and monitoring. Chronic GERD:   [x] Continue to make dietary and lifestyle modifications per our recommendations. .  [] Continue PPI. [x] Continue H2 Blocker (Pepcid). [] Wean PPI. Take every other day for two weeks. If no issues with heartburn/reflux you may decrease to every third day for two weeks. If no issues with heartburn/reflux you may stop the Prilosec. Recommend that you get OTC Pepcid to take should you have occasional heartburn/reflux. Obesity:  [x] Continue to make dietary and lifestyle modifications per our recommendations. [x] Return for follow-up 2 months. Total encounter time: 31 minutes, including any number of the following: Bariatric Postoperative work up/protocols, review of labs, imaging, provider notes, outside hospital records, performing examination/evaluation, counseling patient and/or family, ordering medications/tests, placing referrals and communication with referring physicians, coordination of care; discussing exercise and physical activity; discussing dietary plan/recall with the patient as well with registered dietitian and documentation in the EHR. Of note, the above was done during same day of the actual patient encounter.

## 2021-11-22 ENCOUNTER — OFFICE VISIT (OUTPATIENT)
Dept: BARIATRICS/WEIGHT MGMT | Age: 51
End: 2021-11-22
Payer: COMMERCIAL

## 2021-11-22 VITALS
SYSTOLIC BLOOD PRESSURE: 137 MMHG | WEIGHT: 154.2 LBS | HEIGHT: 59 IN | HEART RATE: 63 BPM | DIASTOLIC BLOOD PRESSURE: 76 MMHG | BODY MASS INDEX: 31.08 KG/M2

## 2021-11-22 DIAGNOSIS — E78.2 MIXED HYPERLIPIDEMIA: Primary | ICD-10-CM

## 2021-11-22 DIAGNOSIS — E55.9 VITAMIN D DEFICIENCY: ICD-10-CM

## 2021-11-22 DIAGNOSIS — E53.8 VITAMIN B12 DEFICIENCY: ICD-10-CM

## 2021-11-22 DIAGNOSIS — K21.9 CHRONIC GERD: ICD-10-CM

## 2021-11-22 DIAGNOSIS — E66.9 OBESITY (BMI 30.0-34.9): ICD-10-CM

## 2021-11-22 DIAGNOSIS — Z98.84 S/P LAPAROSCOPIC SLEEVE GASTRECTOMY: ICD-10-CM

## 2021-11-22 PROCEDURE — 99214 OFFICE O/P EST MOD 30 MIN: CPT | Performed by: NURSE PRACTITIONER

## 2021-11-22 NOTE — PROGRESS NOTES
Dietary Assessment Note      Vitals:   Vitals:    11/22/21 0928   Weight: 154 lb 3.2 oz (69.9 kg)   Height: 4' 11\" (1.499 m)    Patient lost 13.8 lbs over past 2.5 months. Total Weight Loss: 62.4 lbs    Labs reviewed: No new nutrition related labs     Protein intake: 60-80 grams/day     Fluid intake: 48-64 oz/day    Multivitamin/mineral intake: 4 [employer]     Calcium intake: none     Other: none     Exercise: Not currently d/t having migraines     Nutrition Assessment: 6 mo s/p sleeve post-op visit. Pt feels she has gotten off track with meal planning/prepping as she had 2 vacations back to back and wants to get back on track with this. Breakfast: Premier protein shake / shakes from OR turkey hedrick/eggs OR HB egg and sausage links     Snack: tuna with 4-5 ritz crackers. Lunch: sliced turkey/ham with celery sticks OR cauliflower pizza (fresh) made with LF cheese      Snack: apple/orange OR yogurt     Dinner: salmon/chicken and asparagus with broccoli    Snack:   apple/orange OR yogurt OR tuna with 4-5 ritz crackers.      Fluids: Water, chardonnay/mimosa on special occasions    Amount able to eat per sitting: 3 oz + 1/2 cup veggies     Following 30/30/30 rule: Yes     Food Intolerances/issues: crab legs, steak, sirloin    Client Concerns: none     Goals:   Avoid alcohol until 9 months post op  Get back to meal planning/prepping   Pt interested in re-starting HP     Plan: F/U at 8 months     Diana Bravo, RD, LD

## 2021-11-23 ENCOUNTER — TELEPHONE (OUTPATIENT)
Dept: BARIATRICS/WEIGHT MGMT | Age: 51
End: 2021-11-23

## 2021-11-23 NOTE — TELEPHONE ENCOUNTER
Called and left message for patient that I got in touch with the [facility] and they were willing to extend her free time until the end of December 2021. Her time has already been updated by them.   Thank you,  FAREED MarhkamC

## 2022-01-18 ASSESSMENT — ENCOUNTER SYMPTOMS
SHORTNESS OF BREATH: 0
RESPIRATORY NEGATIVE: 1
GASTROINTESTINAL NEGATIVE: 1
ALLERGIC/IMMUNOLOGIC NEGATIVE: 1
COUGH: 0
EYES NEGATIVE: 1

## 2022-01-18 NOTE — PATIENT INSTRUCTIONS
the lining of the stomach. Carbonated beverages release gas and can expand the stomach.  Continue to keep temptation from your kitchen- Keep your pantry and kitchen cabinets cleaned out of those dangerous foods that might tempt you after surgery (chips, cookies, candy, etc.).  Continue to increase your exercise program- Increase your daily physical activity. Aim for 5-6 days per week for 30 minutes. Walking is an easy way to get started with exercising. You want exercise to be a regular part of your life after surgery.  Make sure you have a good support system- There will be many changes and adjustments to make after surgery. It is important to have a supportive friend, family member or co-worker, etc. with whom you can talk. Continue to attend [facility]) Weight Management support groups as they can be helpful in maintaining behaviors. In addition, it is the responsibility of the patient to schedule and follow up on labs and tests completed after surgery. Results will be reviewed at each visit. Patient received dietary handouts and education.     Goals:   - Continue plan

## 2022-01-18 NOTE — PROGRESS NOTES
[facility]) Physicians   [employer]    Subjective:      Patient ID: Eliz Starks is a 45 y.o. female    HPI    8 months s/p sleeve gastrectomy    Eliz Starks is a 45 y.o. obese female , Body mass index is 29.69 kg/m². Patient denies any nausea, vomiting, fevers, chills, shortness of breath, chest pain, constipation or urinary symptoms. Denies any heartburn nor dysphagia.     Past Medical History:   Diagnosis Date    Anxiety 2012    Asthma     Depression 2013    DVT of lower extremity, bilateral ([address]  and     while pregnant    Dysmenorrhea 10/14/2011    Insomnia 2013    Migraine headache 11/10/2010    Obesity 2010    Panic attacks 10/17/2012     Past Surgical History:   Procedure Laterality Date     SECTION  1991     SECTION  1995    ECTOPIC PREGNANCY SURGERY  2002    right salpingo-oopherectomy    ENDOMETRIAL ABLATION  2012    HIATAL HERNIA REPAIR N/A 2021    HERNIA HIATAL REPAIR- LAPAROSCOPIC performed by Kael Hammer MD at [address] [place] [address]     [facility]    [facility] N/A 2021    LAPAROSCOPIC SLEEVE GASTRECTOMY - ETHICON performed by Kael Hammer MD at [address] [place] [address]  1995    UPPER GASTROINTESTINAL ENDOSCOPY N/A 2021    EGD BIOPSY performed by Kael Hammer MD at [place] [address] N/A 2021    INCARCERATED INCISIONAL HERNIA REPAIR-LAPAROSCOPIC performed by Kael Hammer MD at [address] [place], [place] [address] History   Problem Relation Age of Onset    High Blood Pressure Brother     Substance Abuse Brother         alcohol    Diabetes Brother     Hypertension Brother     High Blood Pressure Brother     Substance Abuse Brother LYMPHOPCT 41.3 02/10/2021    MONOPCT 4.7 02/10/2021    EOSRELPCT 2.7 02/10/2021    BASOPCT 0.6 02/10/2021    NEUTROABS 2.3 02/10/2021    LYMPHSABS 1.9 02/10/2021    MONOSABS 0.2 02/10/2021    EOSABS 0.1 02/10/2021     Lab Results   Component Value Date     05/18/2021    K 4.2 05/18/2021     05/18/2021    CO2 22 05/18/2021    ANIONGAP 12 05/18/2021    GLUCOSE 81 07/07/2021    BUN 18 05/18/2021    CREATININE 0.8 05/18/2021    LABGLOM >60 05/18/2021    GFRAA >60 05/18/2021    GFRAA >60 08/15/2012    CALCIUM 9.5 05/18/2021    PROT 7.8 05/18/2021    PROT 7.4 08/15/2012    LABALBU 4.5 05/18/2021    AGRATIO 1.4 05/18/2021    BILITOT <0.2 05/18/2021    ALKPHOS 75 05/18/2021    ALT 14 05/18/2021    AST 18 05/18/2021    GLOB 3.3 05/18/2021     Lab Results   Component Value Date    CHOL 187 07/07/2021    TRIG 82 07/07/2021    HDL 47 07/07/2021    LDLCALC 124 07/07/2021    LABVLDL 14 02/10/2021     Lab Results   Component Value Date    TSHREFLEX 2.47 02/10/2021     Lab Results   Component Value Date    IRON 47 02/10/2021    TIBC 308 02/10/2021    LABIRON 15 02/10/2021     Lab Results   Component Value Date    NIIKYYWN88 1376 02/10/2021    FOLATE 18.55 02/10/2021     Lab Results   Component Value Date    VITD25 32.8 02/10/2021     Lab Results   Component Value Date    LABA1C 4.9 02/10/2021    EAG 93.9 02/10/2021       Review of Systems   Constitutional: Negative. Negative for chills, fatigue and fever. HENT: Negative. Eyes: Negative. Respiratory: Negative. Negative for cough and shortness of breath. Cardiovascular: Negative. Gastrointestinal: Negative. Endocrine: Negative. Genitourinary: Negative. Musculoskeletal: Negative. Skin: Positive for rash (Abdominal folds from loose skin. PCP treating with cream). Allergic/Immunologic: Negative. Neurological: Negative. Hematological: Negative. Psychiatric/Behavioral: Negative. Objective:   Physical Exam  Vitals reviewed. Constitutional:       Appearance: She is well-developed. HENT:      Head: Normocephalic and atraumatic. Eyes:      Conjunctiva/sclera: Conjunctivae normal.      Pupils: Pupils are equal, round, and reactive to light. Cardiovascular:      Rate and Rhythm: Normal rate. Pulmonary:      Effort: Pulmonary effort is normal.   Abdominal:      Palpations: Abdomen is soft. Musculoskeletal:         General: Normal range of motion. Cervical back: Normal range of motion and neck supple. Skin:     General: Skin is warm and dry. Neurological:      Mental Status: She is alert and oriented to person, place, and time. Psychiatric:         Behavior: Behavior normal.         Thought Content: Thought content normal.         Judgment: Judgment normal.       Assessment and Plan:   Patient is 8 months s/p sleeve gastrectomy, down 7.2 lbs with a total weight loss of 69.6 lbs. The patient's current Body mass index is 29.69 kg/m². (1/24/22). She is doing well, denies n/v/dysphagia or reflux. She is tolerating diet, getting adequate fluids and protein. She is exercising at the gym 2-3 days per week doing cardio and strength training/toning. Encouraged continued physical activity. She is taking vitamins as instructed. Had run out and purchasing more today. Discussed with patient that if she ever runs out she can order on [employer] or directly from [employer]. She did meet with the registered dietitian for continued follow up. Discussed with dietitian and I agree with recommendations and plan. Reviewed labs from [facility] completed on 12/14/2021. Discussed with patient trying to utilize compression shorts to help with irritation in skin folds. As she get closer to her weight loss being stable we can refer her to a plastic surgeon for evaluation. This is usually between 12-18 months post-op. We will see her back in 2 months for continued follow up.     Hyperlipidemia:   [x] Continue to make dietary and lifestyle modifications per our recommendations. [] Continue to follow up with their PCP for medication management and monitoring. Chronic GERD:   [x] Continue to make dietary and lifestyle modifications per our recommendations. [] Continue PPI. [] Continue H2 Blocker  [] Wean PPI. Take every other day for two weeks. If no issues with heartburn/reflux you may decrease to every third day for two weeks. If no issues with heartburn/reflux you may stop the Prilosec. Recommend that you get OTC Pepcid to take should you have occasional heartburn/reflux. Obesity:  [x] Continue to make dietary and lifestyle modifications per our recommendations. [x] Return for follow-up 2 months. Total encounter time: 30 minutes, including any number of the following: Bariatric Postoperative work up/protocols, review of labs, imaging, provider notes, outside hospital records, performing examination/evaluation, counseling patient and/or family, ordering medications/tests, placing referrals and communication with referring physicians, coordination of care; discussing exercise and physical activity; discussing dietary plan/recall with the patient as well with registered dietitian and documentation in the EHR. Of note, the above was done during same day of the actual patient encounter.

## 2022-01-24 ENCOUNTER — OFFICE VISIT (OUTPATIENT)
Dept: BARIATRICS/WEIGHT MGMT | Age: 52
End: 2022-01-24
Payer: COMMERCIAL

## 2022-01-24 VITALS
RESPIRATION RATE: 16 BRPM | DIASTOLIC BLOOD PRESSURE: 80 MMHG | HEIGHT: 59 IN | SYSTOLIC BLOOD PRESSURE: 136 MMHG | HEART RATE: 68 BPM | BODY MASS INDEX: 29.64 KG/M2 | WEIGHT: 147 LBS

## 2022-01-24 DIAGNOSIS — E66.3 OVERWEIGHT (BMI 25.0-29.9): ICD-10-CM

## 2022-01-24 DIAGNOSIS — Z98.84 S/P LAPAROSCOPIC SLEEVE GASTRECTOMY: ICD-10-CM

## 2022-01-24 DIAGNOSIS — K21.9 CHRONIC GERD: ICD-10-CM

## 2022-01-24 DIAGNOSIS — E78.2 MIXED HYPERLIPIDEMIA: Primary | ICD-10-CM

## 2022-01-24 PROBLEM — E66.811 OBESITY (BMI 30.0-34.9): Status: RESOLVED | Noted: 2021-09-02 | Resolved: 2022-01-24

## 2022-01-24 PROBLEM — E66.9 OBESITY (BMI 30.0-34.9): Status: RESOLVED | Noted: 2021-09-02 | Resolved: 2022-01-24

## 2022-01-24 PROCEDURE — 99214 OFFICE O/P EST MOD 30 MIN: CPT | Performed by: NURSE PRACTITIONER

## 2022-01-24 NOTE — PROGRESS NOTES
Dietary Assessment Note  Vitals:   Vitals:    01/24/22 1052   BP: 136/80   Pulse: 68   Resp: 16   Weight: 147 lb (66.7 kg)   Height: 4' 11\" (1.499 m)   Patient lost 7.2 lbs over past ~2 months. Total Weight Loss: 69.6 lbs    Labs reviewed: no new labs    Protein intake: 60-80 grams/day     Fluid intake: 60oz- water / sometimes lemonade at [employer] / coffee w/ a little cream    Multivitamin/mineral intake: yes - 4 [employer] per day    Calcium intake: no    Other: none    Exercise: yes - 2-3x/wk, working on toning & cardio    Nutrition Assessment: 8 month post-op visit.      B- turkey hedrick & eggs OR protein shake  S- yogurt OR turkey hedrick  L- salad w/ chicken   S- pecans   D- shrimp OR salmon OR steak w/ broccoli  S- yogurt OR pc of fruit    Amount able to eat per sitting: ~3/4-1 cup volume    Following 30/30/30 rule: yes    Food Intolerances/issues: spicy / sirloin    Client Concerns: none    Goals:   - Continue plan    *Wt goal 135 lb    Plan: Follow up in 2 months OR as directed    William Shaffer RD, LD

## 2022-05-10 ENCOUNTER — OFFICE VISIT (OUTPATIENT)
Dept: BARIATRICS/WEIGHT MGMT | Age: 52
End: 2022-05-10

## 2022-05-10 VITALS
OXYGEN SATURATION: 99 % | DIASTOLIC BLOOD PRESSURE: 73 MMHG | WEIGHT: 134 LBS | HEIGHT: 59 IN | RESPIRATION RATE: 18 BRPM | HEART RATE: 69 BPM | SYSTOLIC BLOOD PRESSURE: 128 MMHG | BODY MASS INDEX: 27.01 KG/M2

## 2022-05-10 DIAGNOSIS — E66.3 OVERWEIGHT (BMI 25.0-29.9): ICD-10-CM

## 2022-05-10 DIAGNOSIS — G89.29 CHRONIC MIDLINE LOW BACK PAIN WITHOUT SCIATICA: ICD-10-CM

## 2022-05-10 DIAGNOSIS — K44.9 HIATAL HERNIA: ICD-10-CM

## 2022-05-10 DIAGNOSIS — M54.50 CHRONIC MIDLINE LOW BACK PAIN WITHOUT SCIATICA: ICD-10-CM

## 2022-05-10 DIAGNOSIS — K21.9 CHRONIC GERD: Primary | ICD-10-CM

## 2022-05-10 DIAGNOSIS — Z98.84 S/P LAPAROSCOPIC SLEEVE GASTRECTOMY: ICD-10-CM

## 2022-05-10 PROCEDURE — 99214 OFFICE O/P EST MOD 30 MIN: CPT | Performed by: SURGERY

## 2022-05-10 NOTE — PROGRESS NOTES
[facility]) Physicians   [employer]  Chetan Young MD, [address] [facility], [address] [place] [id number] . Phone: [phone]  Fax: [phone]            Chief Complaint   Patient presents with   [facility] Post Op Follow Up     1yr s/p sleeve 21           HPI:    Jun Romero is a very pleasant 47 y.o.  female , Body mass index is 27.06 kg/m². Geronimo Osman And multiple medical problems who is presenting for bariatric follow up care. Ap Villalba is s/p laparoscopic sleeve gastrectomy by me 2021. Initial Weight: 217 lbs, Weight Loss: 83 lbs. Comes today to the clinic without any complaints. Patient denies any nausea, vomiting, fevers, chills, shortness of breath, chest pain, constipation or urinary symptoms. Denies any heartburn nor dysphagia. Ap Villalba is feeling very well, and is very active. Patient is very pleased with the weight loss and resolution of co-morbid conditions.       Pain Assessment   Denies any abdominal pain     Past Medical History:   Diagnosis Date    Anxiety 2012    Asthma     Depression 2013    DVT of lower extremity, bilateral ([address]  and     while pregnant    Dysmenorrhea 10/14/2011    Insomnia 2013    Migraine headache 11/10/2010    Obesity 2010    Panic attacks 10/17/2012     Past Surgical History:   Procedure Laterality Date     SECTION  1991     SECTION  1995   Shira Vo ECTOPIC PREGNANCY SURGERY  2002    right salpingo-oopherectomy    ENDOMETRIAL ABLATION  2012    HIATAL HERNIA REPAIR N/A 2021    HERNIA HIATAL REPAIR- LAPAROSCOPIC performed by Benjamin Chou MD at [address] [place] [address] Rd Right     Dr. Kateryna Alvarez Dr[address] [facility] N/A 2021    LAPAROSCOPIC SLEEVE GASTRECTOMY - Deena Sahu performed by Sidra Burk MD at [address] [facility] [address]  December 1995    UPPER GASTROINTESTINAL ENDOSCOPY N/A 2/26/2021    EGD BIOPSY performed by Sidra Burk MD at [place] [address] N/A 5/24/2021    INCARCERATED INCISIONAL HERNIA REPAIR-LAPAROSCOPIC performed by Sidra Burk MD at [address] [facility] History   Problem Relation Age of Onset    High Blood Pressure Brother     Substance Abuse Brother         alcohol    Diabetes Brother     Hypertension Brother     High Blood Pressure Brother     Substance Abuse Brother         alcohol    Diabetes Brother     Hypertension Brother     Substance Abuse Mother         smoker    Cancer Mother         lung cancer    Substance Abuse Father         alcohol    Cirrhosis Father     Other Sister         uterine fibroids    Other Sister         fibrocystic breast disease    Diabetes Sister     High Blood Pressure Sister     Hypertension Sister     Asthma Son      Social History     Tobacco Use    Smoking status: Never Smoker    Smokeless tobacco: Never Used   Substance Use Topics    Alcohol use: Yes     Alcohol/week: 1.0 standard drink     Types: 1 Glasses of wine per week     Comment: occasional     I counseled the patient on the importance of not smoking and risks of ETOH. Allergies   Allergen Reactions    Amoxicillin Hives, Shortness Of Breath, Itching and Swelling     tongue swelling    Penicillins Hives, Shortness Of Breath, Itching and Swelling    Red Dye Hives, Itching and Swelling     The patient had a skin reaction to a red hair dye NOT oral medicines     Bee Venom Swelling     Vitals:    05/10/22 0834   BP: 128/73   Pulse: 69   Resp: 18   SpO2: 99%   Weight: 134 lb (60.8 kg)   Height: 4' 11\" (1.499 m)       Body mass index is 27.06 kg/m².       Lab Results   Component Value Date    WBC 7.2 05/25/2021    RBC 3.85 05/25/2021    HGB 10.8 05/25/2021    HCT 34.1 05/25/2021    MCV 88.6 05/25/2021    MCH 28.1 05/25/2021    MCHC 31.8 05/25/2021    MPV 7.8 05/25/2021    NEUTOPHILPCT 50.7 02/10/2021    LYMPHOPCT 41.3 02/10/2021    MONOPCT 4.7 02/10/2021    EOSRELPCT 2.7 02/10/2021    BASOPCT 0.6 02/10/2021    NEUTROABS 2.3 02/10/2021    LYMPHSABS 1.9 02/10/2021    MONOSABS 0.2 02/10/2021    EOSABS 0.1 02/10/2021     Lab Results   Component Value Date     05/18/2021    K 4.2 05/18/2021     05/18/2021    CO2 22 05/18/2021    ANIONGAP 12 05/18/2021    GLUCOSE 81 07/07/2021    BUN 18 05/18/2021    CREATININE 0.8 05/18/2021    LABGLOM >60 05/18/2021    GFRAA >60 05/18/2021    GFRAA >60 08/15/2012    CALCIUM 9.5 05/18/2021    PROT 7.8 05/18/2021    PROT 7.4 08/15/2012    LABALBU 4.5 05/18/2021    AGRATIO 1.4 05/18/2021    BILITOT <0.2 05/18/2021    ALKPHOS 75 05/18/2021    ALT 14 05/18/2021    AST 18 05/18/2021    GLOB 3.3 05/18/2021     Lab Results   Component Value Date    CHOL 187 07/07/2021    TRIG 82 07/07/2021    HDL 47 07/07/2021    LDLCALC 124 07/07/2021    LABVLDL 14 02/10/2021     Lab Results   Component Value Date    TSHREFLEX 2.47 02/10/2021     Lab Results   Component Value Date    IRON 47 02/10/2021    TIBC 308 02/10/2021    LABIRON 15 02/10/2021     Lab Results   Component Value Date    PEVWGJZM87 1376 02/10/2021    FOLATE 18.55 02/10/2021     Lab Results   Component Value Date    VITD25 32.8 02/10/2021     Lab Results   Component Value Date    LABA1C 4.9 02/10/2021    EAG 93.9 02/10/2021         Current Outpatient Medications:     Multiple Vitamins-Minerals (BARIATRIC FUSION PO), Take 4 tablets by mouth daily, Disp: , Rfl:     LORazepam (ATIVAN) 1 MG tablet, Take 1 mg by mouth every 6 hours as needed for Anxiety. , Disp: , Rfl:     amitriptyline (ELAVIL) 25 MG tablet, Take 25 mg by mouth nightly, Disp: , Rfl:     albuterol (PROVENTIL HFA) 108 (90 BASE) MCG/ACT inhaler, Inhale 2 puffs into the lungs every 4 hours as needed for Wheezing or Shortness of Breath., Disp: 1 Inhaler, Rfl: 12      Review of Systems - History obtained from the patient  General ROS: negative  Psychological ROS: negative  Ophthalmic ROS: negative  Neurological ROS: negative  ENT ROS: negative  Allergy and Immunology ROS: negative  Hematological and Lymphatic ROS: negative  Endocrine ROS: negative  Breast ROS: negative  Respiratory ROS: negative  Cardiovascular ROS: negative  Gastrointestinal ROS:negative  Genito-Urinary ROS: negative  Musculoskeletal ROS: negative   Skin ROS: Excess skin and irritation    Physical Exam   Vitals Reviewed   Constitutional: Patient is oriented to person, place, and time. Patient appears well-developed and well-nourished. Patient is active and cooperative. Non-toxic appearance. No distress. HENT:   Head: Normocephalic and atraumatic. Head is without abrasion and without laceration. Hair is normal.   Right Ear: External ear normal. No lacerations. No drainage, swelling . Left Ear: External ear normal. No lacerations. No drainage, swelling. Mouth / Nose: face mask in place  Eyes: Conjunctivae, EOM and lids are normal. Right eye exhibits no discharge. No foreign body present in the right eye. Left eye exhibits no discharge. No foreign body present in the left eye. No scleral icterus. Neck: Trachea normal and normal range of motion. No JVD present. Pulmonary/Chest: Effort normal. No accessory muscle usage or stridor. No apnea. No respiratory distress. Cardiovascular: Normal rate and no JVD. Abdominal: Normal appearance. Patient exhibits no distension. Abdomen is soft, obese, non tender. Musculoskeletal: Normal range of motion. Patient exhibits no edema. Neurological: Patient is alert and oriented to person, place, and time. Patient has normal strength. GCS eye subscore is 4. GCS verbal subscore is 5. GCS motor subscore is 6. Skin: Skin is warm and dry. No abrasion and no rash noted. Patient is not diaphoretic. No cyanosis or erythema.   Excess skin   Psychiatric: Patient has a normal mood and affect. Speech is normal and behavior is normal. Cognition and memory are normal.       A/P:    [place] was seen today for bariatrics post op follow up. Diagnoses and all orders for this visit:    Chronic GERD  -     CBC with Auto Differential; Future  -     Comprehensive Metabolic Panel; Future  -     Hemoglobin A1C; Future  -     Iron and TIBC; Future  -     Lipid Panel; Future  -     TSH with Reflex; Future  -     Vitamin A; Future  -     Vitamin B1, Whole Blood; Future  -     Vitamin B12 & Folate; Future  -     Vitamin D 25 Hydroxy; Future  -     Vitamin E; Future  -     Protime-INR; Future  -     Ambulatory referral to Plastic Surgery    Overweight (BMI 25.0-29.9)  -     CBC with Auto Differential; Future  -     Comprehensive Metabolic Panel; Future  -     Hemoglobin A1C; Future  -     Iron and TIBC; Future  -     Lipid Panel; Future  -     TSH with Reflex; Future  -     Vitamin A; Future  -     Vitamin B1, Whole Blood; Future  -     Vitamin B12 & Folate; Future  -     Vitamin D 25 Hydroxy; Future  -     Vitamin E; Future  -     Protime-INR; Future  -     Ambulatory referral to Plastic Surgery    S/P laparoscopic sleeve gastrectomy  -     CBC with Auto Differential; Future  -     Comprehensive Metabolic Panel; Future  -     Hemoglobin A1C; Future  -     Iron and TIBC; Future  -     Lipid Panel; Future  -     TSH with Reflex; Future  -     Vitamin A; Future  -     Vitamin B1, Whole Blood; Future  -     Vitamin B12 & Folate; Future  -     Vitamin D 25 Hydroxy; Future  -     Vitamin E; Future  -     Protime-INR; Future  -     Ambulatory referral to Plastic Surgery    Hiatal hernia  -     CBC with Auto Differential; Future  -     Comprehensive Metabolic Panel; Future  -     Hemoglobin A1C; Future  -     Iron and TIBC; Future  -     Lipid Panel; Future  -     TSH with Reflex; Future  -     Vitamin A; Future  -     Vitamin B1, Whole Blood; Future  -     Vitamin B12 & Folate;  Future  -     Vitamin D 25 Hydroxy; Future  -     Vitamin E; Future  -     Protime-INR; Future  -     Ambulatory referral to Plastic Surgery    Chronic midline low back pain without sciatica  -     CBC with Auto Differential; Future  -     Comprehensive Metabolic Panel; Future  -     Hemoglobin A1C; Future  -     Iron and TIBC; Future  -     Lipid Panel; Future  -     TSH with Reflex; Future  -     Vitamin A; Future  -     Vitamin B1, Whole Blood; Future  -     Vitamin B12 & Folate; Future  -     Vitamin D 25 Hydroxy; Future  -     Vitamin E; Future  -     Protime-INR; Future  -     Ambulatory referral to [address] [place] [address] is 47 y.o. female , now with Body mass index is 27.06 kg/m². s/p Sleeve gastrectomy, has lost 13 lbs since last visit, total of 83 lbs weight loss. The patient underwent dietary counseling with registered dietician. I have reviewed, discussed and agree with the dietary plan. Patient is trying hard to keep good dietary and behavior modifications. Patient is monitoring portion sizes, food choices and liquid calories. Patient is trying to exercise regularly. Patient pleased with the surgery outcomes. I encouraged the patient to continue exercise and keeping healthy eating habits. Also counseled the patient extensively on post surgery care. Total encounter time: 32 minutes, including any number of the following: Bariatric Post operative work up/protocols, review of labs, imaging, provider notes, outside hospital records, performing examination/evaluation, counseling patient and/or family, ordering medications/tests, placing referrals and communication with referring physicians, coordination of care; discussing dietary plan/recall with the patient as well with registered dietitian and documentation in the EHR. Of note, the above was done during same day of the actual patient encounter. Chrisalfredo Medrano is here for her 1 year status post sleeve gastrectomy. Patient continues to do very well. Patient BMI is now down to 27. Patient is staying very active and feels very well. Patient rested and discussing body contouring surgery. Very pleased with her progress. We will see the patient back in 6 months for continued follow-up        Obesity as a disease is considered a high risk to patients overall health and should therefore be considered a high risk disease state. Advised the patient that not getting there weight under control, which hopefully would help with getting some of the comorbidities under control. That could increase risk of complications/worsening of those conditions on the long-term. Now with Covid-19 pandemic, CDC and health authorities does classify obese patients as vulnerable and high risk as well. Which makes weight loss a priority for improvement of their wellbeing and overall health. We discussed how her excess weight affects her overall health and importance of weight loss, healthy diet and active lifestyle to alleviate those co morbid conditions, otherwise risk deterioration.       - RTC in 6 months  - Nutrition labs  - Referral to Plastic Surgery          Patient advised that its their responsibility to follow up for care, studies, referrals and/or labs ordered today. Please note that some or all of this report was generated using voice recognition software. Please notify me in case of any questions about the content of this document, as some errors in transcription may have occurred .

## 2022-05-10 NOTE — PROGRESS NOTES
Dietary Assessment Note  Vitals:   Vitals:    05/10/22 0834   BP: 128/73   Pulse: 69   Resp: 18   SpO2: 99%   Weight: 134 lb (60.8 kg)   Height: 4' 11\" (1.499 m)    Patient lost 13 lbs over past ~4 months. Total Weight Loss: 82.6 lbs    Labs reviewed: no new labs    Protein intake: 60-80 grams/day     Fluid intake: >64 oz/day- water / margarito    Multivitamin/mineral intake: yes - 4 fusion daily    Calcium intake: no    Other: none    Exercise: yes - works with  2x/wk, cardio & strength     Nutrition Assessment: 1 year post-op visit.      B- HB egg w/ turkey hedrick & a little fruit OR scrambled eggs w/ cheese OR protein shake  S- 1/2 protein bar OR skips  L- turkey sausage & salad w/ vinaigrette  S- none  D- filet mignon w/ asparagus OR chicken w/ salad OR vegetable medley  S- none    Amount able to eat per sitting: ~1 cup volume    Following 30/30/30 rule: yes    Food Intolerances/issues: t-bone     Client Concerns: pt is concerned with loose skin, PCP has been prescribing meds for yeast    Goals:   - Continue plan    *Wants to maintain weight     Plan: f/u as directed    Moustapha Santiago RD, LD

## 2022-06-13 ENCOUNTER — OFFICE VISIT (OUTPATIENT)
Dept: SURGERY | Age: 52
End: 2022-06-13
Payer: COMMERCIAL

## 2022-06-13 VITALS
OXYGEN SATURATION: 100 % | HEIGHT: 59 IN | WEIGHT: 135.8 LBS | DIASTOLIC BLOOD PRESSURE: 85 MMHG | RESPIRATION RATE: 16 BRPM | BODY MASS INDEX: 27.38 KG/M2 | HEART RATE: 77 BPM | SYSTOLIC BLOOD PRESSURE: 141 MMHG

## 2022-06-13 DIAGNOSIS — N64.81 BREAST PTOSIS: ICD-10-CM

## 2022-06-13 DIAGNOSIS — M79.3 PANNICULITIS: Primary | ICD-10-CM

## 2022-06-13 PROCEDURE — 99204 OFFICE O/P NEW MOD 45 MIN: CPT | Performed by: SURGERY

## 2022-06-13 NOTE — LETTER
Surgery Schedule Request Form  [facility]  [address] [place] [address]. [place], [address] [place] [address] Av  DATE OF SURGERY:     TIME OF SURGERY:      Confirmation#:__________________        Surgeon Name: Sharmin Quintana MD    Phone: [phone]     Fax: [phone]  Procedure Name: 1) Panniculectomy           2) Bilateral mastopexy           3) Abdominoplasty with monsplasty  CPT CODES (required for scheduling): 93629, 33062, MISCABD  DIAGNOSIS:   Panniculitis, breast ptosis    LENGTH OF PROCEDURE: 4 hours (2 hours cosmetic)  Patient Status: 23 hour observation    Labs Needed:   CBC _x__  PT/PTT_x__ INR __x__  CMP __x_ EKG _x__   Urine Hcg _x__            PATIENT NAME: Benny Mark Royce OF BIRTH: 1970  SEX: female   SS #:   PHONE: [phone] (home)     Pre-Op to be done by: PCP  Cardiac Clearance Done by: per PCP    Pt Position:  supine  Patient to meet with Anesthesiology prior to surgery: no     Medications to be stopped 5 days before surgery: anticoagulation     Ancef 2 gm IV OCTOR (NOTE:  If patient weight is > 120 kg, Administer 3 gm)  Other Orders: 1) Transexemic acid 1g IV OCTOR; No Decadron; SA     2) Heparin 5000u subQ (not in abdomen) in preop    INSURANCE:                                               SUBSCRIBER NAME:   MEMBER ID:                                                AUTHORIZATION #:     PCP:                                        ANESTHESIA:  General    Sharmin Quintana MD                             FAX TO: [phone]   QUESTIONS?  CALL: [phone]

## 2022-06-13 NOTE — PROGRESS NOTES
[facility]    Consultation requested by: Benjamin Chou MD    CC: Body contouring    HPI: 47 y.o. female with a PMHx as delineated below who presents in consultation for body contouring. She underwent a sleeve gastrectomy with Dr. Hermelindo Miller losing a total of 95 lbs. Since that time, she notes she is having difficulties with excess skin beneath her abdomen. She has been having significant rashes and skin breakdown from her PCP requiring prescription antimicrobial therapy without any improvement. She notes discomfort as well that limits her activities. She notes back pain as well from the fullness in the lower part of her pannus. Given these issues, she was referred to plastic surgery for evaluation and treatment.       Bariatric surgery: Yes / date:  (Type: sleeve gastrectomy)    Max weight (lbs): 220  Lowest weight (lbs): 135  Current (lbs): 135  Weight change in the past 3 months: No  Max BMI: 40  Current BMI: 27.4    History of DVT/PE: YES (NEEDED HEPARIN DURING PREGNANCY)  Excess skin cover genitals: Yes  Previous body contouring procedures: No  Smoking: No    Last Mammogram:     Current bra size: 34D  Desired bra size: Ok being smaller  Pregnancies/miscarriages:   Breast feeding: no future plans      Past Medical History:   Diagnosis Date    Anxiety 2012    Asthma     Depression 2013    DVT of lower extremity, bilateral ([address]  and     while pregnant    Dysmenorrhea 10/14/2011    Insomnia 2013    Migraine headache 11/10/2010    Obesity 2010    Panic attacks 10/17/2012   DVT    Past Surgical History:   Procedure Laterality Date     SECTION  1991     SECTION  1995   Shira Vo ECTOPIC PREGNANCY SURGERY  2002    right salpingo-oopherectomy    ENDOMETRIAL ABLATION  2012    HIATAL HERNIA REPAIR N/A 2021    HERNIA HIATAL REPAIR- LAPAROSCOPIC performed by Benjamin Chou MD at [address] [facility] [place] N/A 2021    LAPAROSCOPIC SLEEVE GASTRECTOMY - ETHICON performed by Anthony Donald MD at [address]  1995    UPPER GASTROINTESTINAL ENDOSCOPY N/A 2021    EGD BIOPSY performed by Anthony Donald MD at [facility] [address] N/A 2021    INCARCERATED INCISIONAL HERNIA REPAIR-LAPAROSCOPIC performed by Anthony Donald MD at [address] [facility]     Socioeconomic History    Marital status:      Spouse name: Not on file    Number of children: 2    Years of education: Not on file    Highest education level: Not on file   Occupational History    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 [employer] -- doing this type of work since she was 21years old     Comment: as of May 29, 2013   Tobacco Use    Smoking status: Never Smoker    Smokeless tobacco: Never Used   Vaping Use    Vaping Use: Never used   Substance and Sexual Activity    Alcohol use:  Yes     Alcohol/week: 1.0 standard drink     Types: 1 Glasses of wine per week     Comment: occasional    Drug use: No    Sexual activity: Yes     Partners: Male     Comment:  () Armaanjairo Juan A) ( )   Other Topics Concern    Not on file   Social History Narrative         Past Surgical History     root canal - 2001      Section: 1991, 1995     Salpingo-Oophorectomy: right - 2002 (ectopic pregnancy)    Tubal Ligation: 1995    Arthroscopic Knee Surgery: right , right                                                     Last updated by Jose Moreno MD on 2009                  Social History     Marital Status:  ()--  () ---Chiara Parks: 2 Employment Status: employed full-time      Employer: [employer],       Occupation: social work    Alcohol Use: occasionally    Drug Use: none    Tobacco Usage:non-smoker    works at a high [employer] called [employer]    also a therapist at [address] updated by Chapito Colon MD on 10/17/2012                 Family History     mother -  - age 62 - lung cancer , smoker    father -  - age 36 plus - alcohol, cirrhosis        brother - Pavan Wilcoxe - hypertension, alcohol, diabetes    brother - Jose C Mckinney - hypertension, alcohol, diabetes         sister - Emma Robledo - uterine fibroids    sister - Panda Hassan - fibrocystic breast disease, diabetes, hypertension        son - Yvon Alexis III -    son - Saima Splinter -                                     Last updated by Arlet Kat MD - 2013     Social Determinants of Health     Financial Resource Strain:     Difficulty of Paying Living Expenses: Not on file   Food Insecurity:     Worried About [address] in the Last Year: Not on file    [address] in the Last Year: Not on file   Transportation Needs:     Lack of Transportation (Medical): Not on file    Lack of Transportation (Non-Medical):  Not on file   Physical Activity:     Days of Exercise per Week: Not on file    Minutes of Exercise per Session: Not on file   Stress:     Feeling of Stress : Not on file   Social Connections:     Frequency of Communication with Friends and Family: Not on file    Frequency of Social Gatherings with Friends and Family: Not on file    Attends [employer]alian Services: Not on file    Active Member of Clubs or Organizations: Not on file    Attends Club or Organization Meetings: Not on file    Marital Status: Not on file   Intimate Partner Violence:     Fear of Current or Ex-Partner: Not on file    Emotionally Abused: Not on file    Physically Abused: Not on file   Taylor Sexually Abused: Not on file   Housing Stability:     Unable to Pay for Housing in the Last Year: Not on file    Number of [place] in the Last Year: Not on file    Unstable Housing in the Last Year: Not on file     Family History   Problem Relation Age of Onset    High Blood Pressure Brother     Substance Abuse Brother         alcohol    Diabetes Brother     Hypertension Brother     High Blood Pressure Brother     Substance Abuse Brother         alcohol    Diabetes Brother     Hypertension Brother     Substance Abuse Mother         smoker    Cancer Mother         lung cancer    Substance Abuse Father         alcohol    Cirrhosis Father     Other Sister         uterine fibroids    Other Sister         fibrocystic breast disease    Diabetes Sister     High Blood Pressure Sister     Hypertension Sister     Asthma Son        Allergy:   Allergies   Allergen Reactions    Amoxicillin Hives, Shortness Of Breath, Itching and Swelling     tongue swelling    Penicillins Hives, Shortness Of Breath, Itching and Swelling    Red Dye Hives, Itching and Swelling     The patient had a skin reaction to a red hair dye NOT oral medicines     Bee Venom Swelling       ROS History obtained from the patient  General ROS: negative  Psychological ROS: negative  Ophthalmic ROS: negative  Neurological ROS: negative  ENT ROS: negative  Allergy and Immunology ROS: negative  Hematological and Lymphatic ROS: negative  Endocrine ROS: negative  Respiratory ROS: negative  Cardiovascular ROS: negative  Gastrointestinal ROS: See HPI  Genito-Urinary ROS: negative  Musculoskeletal ROS: negative   Skin ROS: See HPI.     EXAM    BP (!) 141/85   Pulse 77   Resp 16   Ht 4' 11\" (1.499 m)   Wt 135 lb 12.8 oz (61.6 kg)   SpO2 100%   BMI 27.43 kg/m²     GEN: NAD, pleasant, healthy  CVS: RRR  PULM: No respiratory distress  HEENT: PERRLA/EOMI; hearing appears within normal limits  NECK: Supple with trachea in midline, no masses  BREAST: Right larger than Left   R  Ptosis thgthrthathdtheth:th th4th Palpable masses: No     Nipple retraction: No     Palpable axillary lymphadenopathy: No     SN-N: 228.1 cm     N-IMF: 8.4 cm     Breast width: 15 cm      L  Ptosis thgthrthathdtheth:th th4th Palpable masses: No     Nipple retraction: No     Palpable axillary lymphadenopathy: No     SN-N: 27 cm     N-IMF: 8.3 cm     Breast width:  15 cm  ABD: soft/NT/ND  Grade 1 pannus with significant mons ptosis  EXT: No lymphedema noted  NEURO: No focal deficits, no obvious CN deficits    IMP: 47 y.o. female with panniculitis and breast ptosis  PLAN: Would benefit from panniculectomy and mastopexy for functional improvement. Additionally, would benefit from abdominoplasty with extensive mons plasty +/- FdL component for improved contour. Will submit functional components to insurance and provide cosmetic pricing. Will then work toward scheduling after clearances. The complexity of body contouring procedures and wound healing physiology were discussed with the patient. SheShe was counseled on ideal medical optimization (nutrition, weight stability, etc.). We also discussed the pros & cons of staging for multiple procedures including controlling tension from various sites and postoperative care managment. Clinical photos were obtained. The risks, benefits, alternatives, outcomes, personnel involved were discussed and all questions were answered in a satisfactory manner according to the patient. Specifically,the risks including, but not limited to: bleeding possibly requiring transfusion orreoperation, infection, seroma, nonhealing of wounds, poor cosmetic outcome, nipple loss/malposition, umbilicus loss, scarring, VTE (DVT/PE), and death were discussed. A significant amount of time was also allocated to nicotine's effect on wound healing and the patient understands that a sub-optimal wound healing and cosmetic result may occur with continued utilization.      Willow Carter MD  [address] [place] [address] [address] Reconstructive Surgery  [phone]  06/13/22

## 2022-06-14 ENCOUNTER — TELEPHONE (OUTPATIENT)
Dept: SURGERY | Age: 52
End: 2022-06-14

## 2022-06-14 NOTE — TELEPHONE ENCOUNTER
The patient was in the office to see Dr. Jaiv Walter yesterday. PLAN: Would benefit from panniculectomy and mastopexy for functional improvement. Additionally, would benefit from abdominoplasty with extensive mons plasty +/- FdL component for improved contour. Will submit functional components to insurance and provide cosmetic pricing. Will then work toward scheduling after clearances.     I received a surgery letter. I spoke with the patient at the home number listed to discuss needed medical records in regards to skin rash, breakdown and treatment. The patient will reach out to her PCP and ask that the records be faxed to the office attention to me. She was provided the fax number. I will leave this phone note open.

## 2022-07-11 NOTE — TELEPHONE ENCOUNTER
I returned the patients call mentioned below. She is aware that I received 1 office note. The patient is going to see if her PCP has any additional notes and if so she will have them faxed. I will leave this phone note open.

## 2022-07-11 NOTE — TELEPHONE ENCOUNTER
Patient called to verify whether the clinical team received any documentation from Dr. Lyn Rivera office (at [facility] #[phone]) regarding the skin removal and the skin that's causing discomfort. Please call and touch base with patient at [phone] regarding an update on the documents.

## 2022-07-11 NOTE — TELEPHONE ENCOUNTER
Patient called for an insurance update. She was advised that we need the clinical documentation prior to submitting for insurance approval. Patient was given fax number again to provide documents.

## 2022-08-09 NOTE — TELEPHONE ENCOUNTER
I spoke with the patient at the home number listed. She stated that she will be seeing her PCP in 2-3 weeks. I will wait on that office visit note. I will leave this phone note open.

## 2022-08-16 ENCOUNTER — NURSE TRIAGE (OUTPATIENT)
Dept: OTHER | Facility: CLINIC | Age: 52
End: 2022-08-16
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
## 2022-08-18 NOTE — TELEPHONE ENCOUNTER
I received medical records from [facility] Dr. Mia Strong. I spoke with Brigitte KIMBERLY at [address] [facility] ([phone]) to see if CPT Code 94698 or 58615 require pre certification. The codes do require pre certification, which I started during our call. I will fax clinicals and pictures to [phone]. I will scan the information that I faxed and the fax success into Epic under the media tab, but I am not able to scan colored pictures. Date Range 8- to 12-    Pending Case # [id number]    I will need to provide cosmetic pricing for  MISCABD. I spoke with the patient at the home number listed to provide an insurance update. I will leave this phone note open.

## 2022-08-30 NOTE — TELEPHONE ENCOUNTER
I spoke with Yesika Parrish at [facility] ([phone]) to follow up on Pending Case # [id number]. PENDING      I will need to provide cosmetic pricing for  MISCABD. I will leave this phone note open.

## 2022-09-01 NOTE — TELEPHONE ENCOUNTER
I spoke with Joselyn Pugh at [place] ([phone]) to follow up on Pending Case # [id number]. PENDING      I will need to provide cosmetic pricing for  MISCABD. I will leave this phone note open.

## 2022-09-02 NOTE — TELEPHONE ENCOUNTER
I received a fax from [address] dated 9-2-2022. I will scan the fax into Epic under the media tab. DENIED  CPT Code 42236  The surgery can be approved under plan criteria when it is done as part of a certain type of breast surgery (reconstruction following mastectomy or to correct deformity caused by trauma, congential abnormality, infection). Denied as cosmetic and not medically necessary. I received a fax from [address] dated 9-2-2022. I will scan the fax into Epic under the media tab. DENIED  CPT Code 19660  Please see the letter for all of the denial details as it is lengthy. I spoke with the patient at the home number listed to discuss the [address]. She wishes that she would have known the stipulations, but I explained that we do not know ourselves and that every insurance company handles things very differently. I offered cosmetic pricing and the patient declined. I will remove the surgery letter from the letter queue. I will close this phone note.

## 2022-09-06 NOTE — TELEPHONE ENCOUNTER
Patient called in stating that she spoke with Yessi Michaels from her insurance plan.  She states that her insurance plan might over turn the denial of her prior authorization due to her height and weight    The patient states that she needs the colored photos emailed to her insurance company so they can reconsider her prior authorization    Please contact the patient at [phone]

## 2022-09-07 NOTE — TELEPHONE ENCOUNTER
I returned the patients call mentioned below. She is aware that I previously submitted pictures on 8-. She is asking that I emailed colored photos of her stomach only to   [email or web address], so that they can reconsider CPT Code 00217. The pictures that I emailed are saved on my computer. DONE. I will close this phone note.

## 2022-09-08 NOTE — TELEPHONE ENCOUNTER
I received a fax from [address] [facility] dated 9-8-2022. I will scan the fax into Epic under the media. APPEAL DENIED    Please see the [address] letter for specific details. I spoke with the patient at the home number listed to discuss the letter. I asked the patient to please reach out to us if we can be of any assistance going forward. I will close this phone note.

## 2022-09-15 ENCOUNTER — HOSPITAL ENCOUNTER (OUTPATIENT)
Dept: PHYSICAL THERAPY | Age: 52
Setting detail: THERAPIES SERIES
Discharge: HOME | End: 2022-09-15
Payer: COMMERCIAL

## 2022-09-15 PROCEDURE — 97163 PT EVAL HIGH COMPLEX 45 MIN: CPT

## 2022-09-15 PROCEDURE — 97110 THERAPEUTIC EXERCISES: CPT
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
## 2022-09-22 ENCOUNTER — NURSE TRIAGE (OUTPATIENT)
Dept: OTHER | Facility: CLINIC | Age: 52
End: 2022-09-22
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
## 2022-09-23 ENCOUNTER — HOSPITAL ENCOUNTER (OUTPATIENT)
Dept: MRI IMAGING | Age: 52
Discharge: HOME | End: 2022-09-23
Payer: COMMERCIAL

## 2022-09-23 DIAGNOSIS — S39.012A STRAIN OF TENDON OF BACK, INITIAL ENCOUNTER: ICD-10-CM

## 2022-09-23 PROCEDURE — 72141 MRI NECK SPINE W/O DYE: CPT

## 2022-09-23 PROCEDURE — 72148 MRI LUMBAR SPINE W/O DYE: CPT

## 2022-09-27 ENCOUNTER — HOSPITAL ENCOUNTER (OUTPATIENT)
Dept: PHYSICAL THERAPY | Age: 52
Setting detail: THERAPIES SERIES
Discharge: HOME | End: 2022-09-27
Payer: COMMERCIAL

## 2022-09-27 PROCEDURE — 97110 THERAPEUTIC EXERCISES: CPT

## 2022-09-27 PROCEDURE — G0283 ELEC STIM OTHER THAN WOUND: HCPCS

## 2022-09-27 PROCEDURE — 97140 MANUAL THERAPY 1/> REGIONS: CPT
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
## 2022-09-29 ENCOUNTER — APPOINTMENT (OUTPATIENT)
Dept: GENERAL RADIOLOGY | Age: 52
End: 2022-09-29
Payer: COMMERCIAL

## 2022-09-29 ENCOUNTER — HOSPITAL ENCOUNTER (EMERGENCY)
Age: 52
Discharge: HOME | End: 2022-09-29
Attending: EMERGENCY MEDICINE
Payer: COMMERCIAL

## 2022-09-29 ENCOUNTER — HOSPITAL ENCOUNTER (OUTPATIENT)
Dept: PHYSICAL THERAPY | Age: 52
Setting detail: THERAPIES SERIES
Discharge: HOME | End: 2022-09-29
Payer: COMMERCIAL

## 2022-09-29 VITALS
DIASTOLIC BLOOD PRESSURE: 81 MMHG | RESPIRATION RATE: 20 BRPM | HEART RATE: 71 BPM | OXYGEN SATURATION: 100 % | SYSTOLIC BLOOD PRESSURE: 153 MMHG | TEMPERATURE: 98 F

## 2022-09-29 DIAGNOSIS — R07.89 CHEST TIGHTNESS: Primary | ICD-10-CM

## 2022-09-29 LAB
EKG ATRIAL RATE: 71 BPM
EKG DIAGNOSIS: NORMAL
EKG P AXIS: 61 DEGREES
EKG P-R INTERVAL: 142 MS
EKG Q-T INTERVAL: 422 MS
EKG QRS DURATION: 88 MS
EKG QTC CALCULATION (BAZETT): 458 MS
EKG R AXIS: 1 DEGREES
EKG T AXIS: 25 DEGREES
EKG VENTRICULAR RATE: 71 BPM

## 2022-09-29 PROCEDURE — 71046 X-RAY EXAM CHEST 2 VIEWS: CPT

## 2022-09-29 PROCEDURE — 99284 EMERGENCY DEPT VISIT MOD MDM: CPT

## 2022-09-29 PROCEDURE — 6370000000 HC RX 637 (ALT 250 FOR IP): Performed by: EMERGENCY MEDICINE

## 2022-09-29 PROCEDURE — 93005 ELECTROCARDIOGRAM TRACING: CPT | Performed by: EMERGENCY MEDICINE

## 2022-09-29 PROCEDURE — 93010 ELECTROCARDIOGRAM REPORT: CPT | Performed by: INTERNAL MEDICINE

## 2022-09-29 PROCEDURE — 94640 AIRWAY INHALATION TREATMENT: CPT

## 2022-09-29 PROCEDURE — 94760 N-INVAS EAR/PLS OXIMETRY 1: CPT

## 2022-09-29 RX ORDER — IPRATROPIUM BROMIDE AND ALBUTEROL SULFATE 2.5; .5 MG/3ML; MG/3ML
1 SOLUTION RESPIRATORY (INHALATION) ONCE
Status: COMPLETED | OUTPATIENT
Start: 2022-09-29 | End: 2022-09-29

## 2022-09-29 RX ORDER — ALBUTEROL SULFATE 2.5 MG/3ML
2.5 SOLUTION RESPIRATORY (INHALATION) EVERY 4 HOURS PRN
Qty: 120 EACH | Refills: 0 | Status: SHIPPED | OUTPATIENT
Start: 2022-09-29

## 2022-09-29 RX ORDER — CYCLOBENZAPRINE HCL 10 MG
10 TABLET ORAL 3 TIMES DAILY PRN
COMMUNITY

## 2022-09-29 RX ORDER — KETOROLAC TROMETHAMINE 30 MG/ML
15 INJECTION, SOLUTION INTRAMUSCULAR; INTRAVENOUS ONCE
Status: DISCONTINUED | OUTPATIENT
Start: 2022-09-29 | End: 2022-09-29 | Stop reason: HOSPADM

## 2022-09-29 RX ORDER — PREDNISONE 50 MG/1
50 TABLET ORAL DAILY
Qty: 5 TABLET | Refills: 0 | Status: SHIPPED | OUTPATIENT
Start: 2022-09-29 | End: 2022-10-04

## 2022-09-29 RX ADMIN — IPRATROPIUM BROMIDE AND ALBUTEROL SULFATE 1 AMPULE: 2.5; .5 SOLUTION RESPIRATORY (INHALATION) at 06:20

## 2022-09-29 NOTE — LETTER
[facility] Emergency Department  [address] [facility], [address]             September 29, 2022    Patient: Ana Cristina García   YOB: 1970   Date of Visit: 9/29/2022       To Whom It May Concern:    Daphne Brody was seen and treated in our emergency department on 9/29/2022. She may return to work on 09/30/2022.       Sincerely,             Dr. Eliazar Dutton
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
## 2022-09-29 NOTE — DISCHARGE INSTRUCTIONS
Please return if you have any new, worsening, or concerning symptoms like inability to eat/drink/walk, fevers, trouble breathing, chest pain    If you change your mind, please return at any time and we can continue our work-up    Contact your primary care physician tomorrow to make a follow up appointment this week.   There was an incidental nodule on your chest x-ray which will need imaging ordered by your doctor

## 2022-09-29 NOTE — ED NOTES
Pt was hysterical on arrival, pt states that she is a hard stick and was very nervous. Pt was told after two attempts for blood that she could wait in the lobby for her xray results. Pt states that she was very unhappy with going to sit out there and that she was dizzy.       Tomas Medrano RN  09/29/22 1149
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
## 2022-09-29 NOTE — ED NOTES
Pt states that she doesn't want anyone else to try for blood.  Pt refused blood draws      Chris Acosta RN  09/29/22 8391

## 2022-09-29 NOTE — LETTER
[facility] Emergency Department      [address] [place] [address], [address]            PROOF OF PRESENCE      To Whom It May Concern:    Greg Mak was present in the Emergency Department at [facility] on 09/29/22.                                      Sincerely,              Dr. Leyva//

## 2022-10-04 ENCOUNTER — HOSPITAL ENCOUNTER (OUTPATIENT)
Dept: PHYSICAL THERAPY | Age: 52
Setting detail: THERAPIES SERIES
Discharge: HOME | End: 2022-10-04
Payer: COMMERCIAL

## 2022-10-04 PROCEDURE — 97110 THERAPEUTIC EXERCISES: CPT

## 2022-10-04 PROCEDURE — G0283 ELEC STIM OTHER THAN WOUND: HCPCS

## 2022-10-04 PROCEDURE — 97140 MANUAL THERAPY 1/> REGIONS: CPT
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
## 2022-10-07 ENCOUNTER — HOSPITAL ENCOUNTER (OUTPATIENT)
Dept: PHYSICAL THERAPY | Age: 52
Setting detail: THERAPIES SERIES
Discharge: HOME | End: 2022-10-07
Payer: COMMERCIAL

## 2022-10-07 PROCEDURE — 97110 THERAPEUTIC EXERCISES: CPT

## 2022-10-07 PROCEDURE — 97112 NEUROMUSCULAR REEDUCATION: CPT

## 2022-10-07 PROCEDURE — 97140 MANUAL THERAPY 1/> REGIONS: CPT
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
## 2022-10-11 ENCOUNTER — HOSPITAL ENCOUNTER (OUTPATIENT)
Dept: PHYSICAL THERAPY | Age: 52
Setting detail: THERAPIES SERIES
Discharge: HOME | End: 2022-10-11
Payer: COMMERCIAL

## 2022-10-11 PROCEDURE — 97112 NEUROMUSCULAR REEDUCATION: CPT

## 2022-10-11 PROCEDURE — 97110 THERAPEUTIC EXERCISES: CPT

## 2022-10-11 PROCEDURE — 97140 MANUAL THERAPY 1/> REGIONS: CPT
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
## 2022-10-14 ENCOUNTER — HOSPITAL ENCOUNTER (OUTPATIENT)
Dept: PHYSICAL THERAPY | Age: 52
Setting detail: THERAPIES SERIES
Discharge: HOME | End: 2022-10-14
Payer: COMMERCIAL

## 2022-10-14 PROCEDURE — 97112 NEUROMUSCULAR REEDUCATION: CPT

## 2022-10-14 PROCEDURE — 97140 MANUAL THERAPY 1/> REGIONS: CPT

## 2022-10-14 PROCEDURE — 97110 THERAPEUTIC EXERCISES: CPT
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
## 2022-10-18 ENCOUNTER — HOSPITAL ENCOUNTER (OUTPATIENT)
Dept: PHYSICAL THERAPY | Age: 52
Setting detail: THERAPIES SERIES
Discharge: HOME | End: 2022-10-18
Payer: COMMERCIAL

## 2022-10-18 PROCEDURE — 97140 MANUAL THERAPY 1/> REGIONS: CPT

## 2022-10-18 PROCEDURE — 97110 THERAPEUTIC EXERCISES: CPT

## 2022-10-18 PROCEDURE — 97112 NEUROMUSCULAR REEDUCATION: CPT
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
## 2022-10-21 ENCOUNTER — HOSPITAL ENCOUNTER (OUTPATIENT)
Dept: PHYSICAL THERAPY | Age: 52
Setting detail: THERAPIES SERIES
Discharge: HOME | End: 2022-10-21
Payer: COMMERCIAL

## 2022-10-21 PROCEDURE — 97140 MANUAL THERAPY 1/> REGIONS: CPT

## 2022-10-21 PROCEDURE — 97112 NEUROMUSCULAR REEDUCATION: CPT

## 2022-10-21 PROCEDURE — 97110 THERAPEUTIC EXERCISES: CPT
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
## 2022-10-25 ENCOUNTER — HOSPITAL ENCOUNTER (OUTPATIENT)
Dept: PHYSICAL THERAPY | Age: 52
Setting detail: THERAPIES SERIES
Discharge: HOME | End: 2022-10-25
Payer: COMMERCIAL

## 2022-10-25 NOTE — FLOWSHEET NOTE
[facility]     Physical Therapy  Cancellation/No-show Note  Patient Name:  Isak Lomas  :  1970   Date:  10/25/2022  Cancelled visits to date: 0  No-shows to date: 2    Patient status for today's appointment patient:  []  Cancelled  []  Rescheduled appointment  [x]  No-show , 10/25     Reason given by patient:  []  Patient ill  []  Conflicting appointment  []  No transportation    []  Conflict with work  []  No reason given  []  Other:     Comments:      Phone call information:   [x]  Phone call made today to patient at 10:55 am at number provided:      []  Patient answered, conversation as follows:    [x]  Patient did not answer, message left as follows: Informed patient of missed visit and next scheduled visit on 10/28 at 11:30 am.  []  Phone call not made today  []  Phone call not needed - pt contacted us to cancel and provided reason for cancellation.      Electronically signed by:  Efraín Phoenix PT

## 2022-10-28 ENCOUNTER — HOSPITAL ENCOUNTER (OUTPATIENT)
Dept: PHYSICAL THERAPY | Age: 52
Setting detail: THERAPIES SERIES
Discharge: HOME | End: 2022-10-28
Payer: COMMERCIAL

## 2022-10-28 PROCEDURE — 97140 MANUAL THERAPY 1/> REGIONS: CPT

## 2022-10-28 PROCEDURE — 97110 THERAPEUTIC EXERCISES: CPT

## 2022-10-28 PROCEDURE — 97112 NEUROMUSCULAR REEDUCATION: CPT

## 2022-10-28 NOTE — FLOWSHEET NOTE
[facility] Physical Therapy  Phone: [phone]   Fax: [phone]    Physical Therapy Daily Treatment Note    Date:  10/28/2022     Patient Name:  Cornel Meadows    :  1970  MRN: [id number]  Medical Diagnosis:  Strain of muscle, fascia and tendon at neck level, initial encounter [S16. 1XXA]  Strain of muscle and tendon of back wall of thorax, initial encounter [S29.012A]  Strain of other muscles, fascia and tendons at shoulder and upper arm level, left arm, initial encounter [P64.221X]  Treatment Diagnosis: dec cervical ROM, dec DCF NM control, impaired posture, tight B UT, dec strength      Insurance/Certification information:  PT Insurance Information: WC - 18 visits approved -10/14/22; : [employer] - fax [phone]  Physician Information:  Sp Fritz MD    Plan of care signed (Y/N): [x]  Yes []  No     Date of Patient follow up with Physician:      Progress Report: []  Yes  [x]  No     Date Range for reporting period:  Beginnin/15/2022  PN: 10/18/22  Ending:     Progress report due (10 Rx/or 30 days whichever is less): visit #16 or  (date)     Recertification due (POC duration/ or 90 days whichever is less): visit #16 or 11/15/22 (date)     Visit # Insurance Allowable Auth required? Date Range   6/16  3/12 18 visits  12 visits [x]  Yes - WC  []  No -10/14/22  10/10-22       Latex Allergy:  [x]NO      []YES  Preferred Language for Healthcare:   [x]English       []other:    Functional Scale:           Date assessed:  FOTO physical FS primary measure score = 32; risk adjusted = 44  9/15/22    Pain level:  4/10    SUBJECTIVE:  Pt reports she is feeling more stiff than pain. She had called to cancel her prior appt on 10/25, had left VM, but PT was not informed, that's why it is written as a no show, but PT unable to addend note. Pt had a low-grade fever from a shingles shot.  Pt states felt better at end of session Education:  09/15/2022  -patient educated on diagnosis, prognosis and expectations for rehab  -all patient questions were answered    Home Exercise Program:  Access Code: [email or web address]  URL: [email or web address]  Date: 09/15/2022  Prepared by: Julia Quiroga    Exercises  Walking - 1 x daily - 7 x weekly - 3 sets - 10 reps  Supine Transversus Abdominis Bracing - Hands on Stomach - 1 x daily - 7 x weekly - 3 sets - 10 reps  Supine Cervical Flexion Extension on Pillow - 1 x daily - 7 x weekly - 3 sets - 10 reps  Supine Cervical Rotation AROM on Pillow - 1 x daily - 7 x weekly - 3 sets - 10 reps  Supine Isometric Neck Rotation - 1 x daily - 7 x weekly - 1 sets - 10 reps - 3-5 hold  Seated Scapular Retraction - 1 x daily - 7 x weekly - 2 sets - 10 reps - 5 hold  Seated Backward Shoulder Rolls - 1 x daily - 7 x weekly - 3 sets - 10 reps    Therapeutic Exercise and NMR EXR  [x] (36828) Provided verbal/tactile cueing for activities related to strengthening, flexibility, endurance, ROM for improvements in  [] LE / Lumbar: LE, proximal hip, and core control with self care, mobility, lifting, ambulation. [x] UE / Cervical: cervical, postural, scapular, scapulothoracic and UE control with self care, reaching, carrying, lifting, house/yardwork, driving, computer work.  [] (24570) Provided verbal/tactile cueing for activities related to improving balance, coordination, kinesthetic sense, posture, motor skill, proprioception to assist with   [] LE / lumbar: LE, proximal hip, and core control in self care, mobility, lifting, ambulation and eccentric single leg control.    [] UE / cervical: cervical, scapular, scapulothoracic and UE control with self care, reaching, carrying, lifting, house/yardwork, driving, computer work.   [] (61653) Therapist is in constant attendance of 2 or more patients providing skilled therapy interventions, but not providing any significant amount of measurable one-on-one time to either patient, for improvements in  [] LE / lumbar: LE, proximal hip, and core control in self care, mobility, lifting, ambulation and eccentric single leg control. [] UE / cervical: cervical, scapular, scapulothoracic and UE control with self care, reaching, carrying, lifting, house/yardwork, driving, computer work. NMR and Therapeutic Activities:    [x] (91906 or 32362) Provided verbal/tactile cueing for activities related to improving balance, coordination, kinesthetic sense, posture, motor skill, proprioception and motor activation to allow for proper function of   [] LE: / Lumbar core, proximal hip and LE with self care and ADLs  [x] UE / Cervical: cervical, postural, scapular, scapulothoracic and UE control with self care, carrying, lifting, driving, computer work.   [] (59919) Gait Re-education- Provided training and instruction to the patient for proper LE, core and proximal hip recruitment and positioning and eccentric body weight control with ambulation re-education including up and down stairs     Home Management Training / Self Care:  [] (10512) Provided self-care/home management training related to activities of daily living and compensatory training, and/or use of adaptive equipment for improvement with: ADLs and compensatory training, meal preparation, safety procedures and instruction in use of adaptive equipment, including bathing, grooming, dressing, personal hygiene, basic household cleaning and chores.      Home Exercise Program:    [x] (94134) Reviewed/Progressed HEP activities related to strengthening, flexibility, endurance, ROM of   [] LE / Lumbar: core, proximal hip and LE for functional self-care, mobility, lifting and ambulation/stair navigation   [] UE / Cervical: cervical, postural, scapular, scapulothoracic and UE control with self care, reaching, carrying, lifting, house/yardwork, driving, computer work  [] (48905)Reviewed/Progressed HEP activities related to improving balance, coordination, kinesthetic sense, posture, motor skill, proprioception of   [] LE: core, proximal hip and LE for self care, mobility, lifting, and ambulation/stair navigation    [] UE / Cervical: cervical, postural,  scapular, scapulothoracic and UE control with self care, reaching, carrying, lifting, house/yardwork, driving, computer work    Manual Treatments:  PROM / STM / Oscillations-Mobs:  G-I, II, III, IV (PA's, Inf., Post.)  [x] (32595) Provided manual therapy to mobilize LE, proximal hip and/or LS spine soft tissue/joints for the purpose of modulating pain, promoting relaxation,  increasing ROM, reducing/eliminating soft tissue swelling/inflammation/restriction, improving soft tissue extensibility and allowing for proper ROM for normal function with   [] LE / lumbar: self care, mobility, lifting and ambulation. [x] UE / Cervical: self care, reaching, carrying, lifting, house/yardwork, driving, computer work. Modalities:  [] (96246) Vasopneumatic compression: Utilized vasopneumatic compression to decrease edema / swelling for the purpose of improving mobility and quad tone / recruitment which will allow for increased overall function including but not limited to self-care, transfers, ambulation, and ascending / descending stairs.        Charges:  Timed Code Treatment Minutes: 40   Total Treatment Minutes: 40     [] EVAL - LOW (99616)   [] EVAL - MOD (19789)  [] EVAL - HIGH (78277)  [] RE-EVAL (74961)  [x] GD(88302) x  1     [] Ionto  [x] NMR (44496) x  1     [] Vaso  [x] Manual (71174) x   1    [] Ultrasound  [] TA x        [] Mech Traction (94833)  [] Aquatic Therapy x     [] ES (un) (35128):   [] Home Management Training x  [] ES(attended) (97855)   [] Dry Needling 1-2 muscles (66954):  [] Dry Needling 3+ muscles (424618)  [] Group:      [] Other:     GOALS:   Patient stated goal: no back spasms, feel better with movements  [x] Progressing: [] Met: [] Not Met: [] Adjusted     Therapist goals for Patient:   Short Term Goals: To be achieved in: 2 weeks  1. Independent in HEP and progression per patient tolerance, in order to prevent re-injury. [] Progressing: [x] Met: [] Not Met: [] Adjusted  2. Patient will have a decrease in pain to facilitate improvement in movement, function, and ADLs as indicated by improvement with respect to Functional Deficits. [] Progressing: [x] Met: [] Not Met: [] Adjusted     Long Term Goals: To be achieved in: 8 weeks  1. FOTO functional survey score of >/= 51  to assist with reaching prior level of function. [x] Progressing: [] Met: [] Not Met: [] Adjusted  2. Patient will demonstrate increased AROM  to [facility], to allow for proper joint functioning to allow pt to resume driving without increase in symptoms. [x] Progressing: [] Met: [] Not Met: [] Adjusted  3. Patient will demonstrate increased Strength and core activation to allow for proper functional mobility as indicated by patients Functional Deficits to allow pt to resume lifting, carrying, walking, standing, transfers, sleeping without increase in symptoms. [x] Progressing: [] Met: [] Not Met: [] Adjusted  4. Patient will return to functional activities including driving, work related tasks, and sleeping without increased symptoms or restriction. [x] Progressing: [] Met: [] Not Met: [] Adjusted    Overall Progression Towards Functional goals/ Treatment Progress Update:  [] Patient is progressing as expected towards functional goals listed. [] Progression is slowed due to complexities/Impairments listed. [] Progression has been slowed due to co-morbidities.   [x] Plan just implemented, too soon to assess goals progression <30days   [] Goals require adjustment due to lack of progress  [] Patient is not progressing as expected and requires additional follow up with physician  [] Other    Persisting Functional Limitations/Impairments:  [x]Sleeping [x]Sitting               []Standing []Transfers        []Walking []Kneeling               []Stairs []Squatting / bending   []ADLs []Reaching  []Lifting  []Housework  [x]Driving [x]Job related tasks  []Sports/Recreation []Other:        ASSESSMENT:  Progressed exercises as noted in bold per log. Pt tolerated progressions well denies pain exacerbation. No cavitation achieved with manipulations, though pt did report dec stiffness following. Pt with L 1st rib hypomobility. Pt continues to respond well to manual techniques. Still waiting on [employer] e-stim machine due to delay regarding paperwork. Pt to benefit from continued therapy for reduced muscle tension, pain management, and to build HEP so pt can better manage pain on days that she does not have PT. Treatment/Activity Tolerance:  [] Patient able to complete tx [] Patient limited by fatigue  [x] Patient limited by pain  [] Patient limited by other medical complications  [] Other:     Prognosis: [] Good [x] Fair  [] Poor    Patient Requires Follow-up: [x] Yes  [] No    Plan for next treatment session:    PLAN: See eval. PT 2x / week for 8 weeks. [x] Continue per plan of care [] Alter current plan (see comments)  [] Plan of care initiated [] Hold pending MD visit [] Discharge    Electronically signed by: Blanca Hendrickson, PT  DPT, OMT-C    Note: If patient does not return for scheduled/ recommended follow up visits, this note will serve as a discharge from care along with most recent update on progress.

## 2022-11-01 ENCOUNTER — HOSPITAL ENCOUNTER (OUTPATIENT)
Dept: PHYSICAL THERAPY | Age: 52
Setting detail: THERAPIES SERIES
Discharge: HOME | End: 2022-11-01
Payer: COMMERCIAL

## 2022-11-01 PROCEDURE — 97140 MANUAL THERAPY 1/> REGIONS: CPT

## 2022-11-01 PROCEDURE — 97110 THERAPEUTIC EXERCISES: CPT
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
## 2022-11-04 ENCOUNTER — HOSPITAL ENCOUNTER (OUTPATIENT)
Dept: PHYSICAL THERAPY | Age: 52
Setting detail: THERAPIES SERIES
Discharge: HOME | End: 2022-11-04
Payer: COMMERCIAL

## 2022-11-04 PROCEDURE — 97110 THERAPEUTIC EXERCISES: CPT

## 2022-11-04 PROCEDURE — 97140 MANUAL THERAPY 1/> REGIONS: CPT

## 2022-11-04 PROCEDURE — 97112 NEUROMUSCULAR REEDUCATION: CPT
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
## 2022-11-08 ENCOUNTER — HOSPITAL ENCOUNTER (OUTPATIENT)
Dept: PHYSICAL THERAPY | Age: 52
Setting detail: THERAPIES SERIES
Discharge: HOME | End: 2022-11-08
Payer: COMMERCIAL

## 2022-11-11 ENCOUNTER — HOSPITAL ENCOUNTER (OUTPATIENT)
Dept: PHYSICAL THERAPY | Age: 52
Setting detail: THERAPIES SERIES
End: 2022-11-11
Payer: COMMERCIAL

## 2022-11-15 ENCOUNTER — OFFICE VISIT (OUTPATIENT)
Dept: BARIATRICS/WEIGHT MGMT | Age: 52
End: 2022-11-15
Payer: COMMERCIAL

## 2022-11-15 ENCOUNTER — HOSPITAL ENCOUNTER (OUTPATIENT)
Dept: PHYSICAL THERAPY | Age: 52
Setting detail: THERAPIES SERIES
Discharge: HOME | End: 2022-11-15
Payer: COMMERCIAL

## 2022-11-15 VITALS
DIASTOLIC BLOOD PRESSURE: 72 MMHG | BODY MASS INDEX: 28.02 KG/M2 | OXYGEN SATURATION: 100 % | SYSTOLIC BLOOD PRESSURE: 105 MMHG | HEART RATE: 74 BPM | HEIGHT: 59 IN | WEIGHT: 139 LBS

## 2022-11-15 DIAGNOSIS — Z98.84 S/P LAPAROSCOPIC SLEEVE GASTRECTOMY: ICD-10-CM

## 2022-11-15 DIAGNOSIS — M54.50 CHRONIC MIDLINE LOW BACK PAIN WITHOUT SCIATICA: Primary | ICD-10-CM

## 2022-11-15 DIAGNOSIS — G89.29 CHRONIC MIDLINE LOW BACK PAIN WITHOUT SCIATICA: Primary | ICD-10-CM

## 2022-11-15 DIAGNOSIS — E78.2 MIXED HYPERLIPIDEMIA: ICD-10-CM

## 2022-11-15 PROBLEM — K21.9 CHRONIC GERD: Status: RESOLVED | Noted: 2021-01-15 | Resolved: 2022-11-15

## 2022-11-15 PROCEDURE — 99214 OFFICE O/P EST MOD 30 MIN: CPT | Performed by: SURGERY

## 2022-11-15 PROCEDURE — 97110 THERAPEUTIC EXERCISES: CPT

## 2022-11-15 PROCEDURE — 97140 MANUAL THERAPY 1/> REGIONS: CPT
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
## 2022-11-18 ENCOUNTER — HOSPITAL ENCOUNTER (OUTPATIENT)
Dept: PHYSICAL THERAPY | Age: 52
Setting detail: THERAPIES SERIES
Discharge: HOME | End: 2022-11-18
Payer: COMMERCIAL

## 2022-11-18 PROCEDURE — 97110 THERAPEUTIC EXERCISES: CPT

## 2022-11-18 PROCEDURE — 97140 MANUAL THERAPY 1/> REGIONS: CPT

## 2022-11-18 PROCEDURE — 97112 NEUROMUSCULAR REEDUCATION: CPT
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
## 2022-11-22 ENCOUNTER — HOSPITAL ENCOUNTER (OUTPATIENT)
Dept: PHYSICAL THERAPY | Age: 52
Setting detail: THERAPIES SERIES
Discharge: HOME | End: 2022-11-22
Payer: COMMERCIAL

## 2022-11-22 NOTE — FLOWSHEET NOTE
[facility]     Physical Therapy  Cancellation/No-show Note  Patient Name:  Maximilian Brantley  :  1970   Date:  2022  Cancelled visits to date: 3  No-shows to date: 1    Patient status for today's appointment patient:  [x]  Cancelled 10/25, ,   []  Rescheduled appointment  []  No-show      Reason given by patient:  [x]  Patient ill  []  Conflicting appointment  []  No transportation    []  Conflict with work  []  No reason given  []  Other:     Comments:      Phone call information:   [x]  Phone call made today to patient at  at number provided:      [x]  Patient answered, conversation as follows: Patient called as PT sick, pt stated she was just about to call to cancel her appt also because she is sick. Asthma flare up. []  Patient did not answer, message left as follows:  []  Phone call not made today  []  Phone call not needed - pt contacted us to cancel and provided reason for cancellation.      Electronically signed by:  Yenny Xie, PT

## 2022-11-29 ENCOUNTER — HOSPITAL ENCOUNTER (OUTPATIENT)
Dept: PHYSICAL THERAPY | Age: 52
Setting detail: THERAPIES SERIES
Discharge: HOME | End: 2022-11-29
Payer: COMMERCIAL

## 2022-11-29 PROCEDURE — 97012 MECHANICAL TRACTION THERAPY: CPT

## 2022-11-29 PROCEDURE — 97140 MANUAL THERAPY 1/> REGIONS: CPT

## 2022-11-29 PROCEDURE — 97110 THERAPEUTIC EXERCISES: CPT
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
## 2022-12-02 ENCOUNTER — HOSPITAL ENCOUNTER (OUTPATIENT)
Dept: PHYSICAL THERAPY | Age: 52
Setting detail: THERAPIES SERIES
Discharge: HOME | End: 2022-12-02
Payer: COMMERCIAL

## 2022-12-02 PROCEDURE — 97140 MANUAL THERAPY 1/> REGIONS: CPT

## 2022-12-02 PROCEDURE — 97110 THERAPEUTIC EXERCISES: CPT
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
## 2022-12-15 ENCOUNTER — HOSPITAL ENCOUNTER (OUTPATIENT)
Dept: PHYSICAL THERAPY | Age: 52
Setting detail: THERAPIES SERIES
Discharge: HOME | End: 2022-12-15
Payer: COMMERCIAL

## 2022-12-15 NOTE — FLOWSHEET NOTE
[address]     Physical Therapy  Cancellation/No-show Note  Patient Name:  Sharon Hoang  :  1970   Date:  12/15/2022  Cancelled visits to date: 4  No-shows to date: 1    Patient status for today's appointment patient:  [x]  Cancelled 10/25, , , 12/15  []  Rescheduled appointment  []  No-show      Reason given by patient:  []  Patient ill  []  Conflicting appointment  []  No transportation    []  Conflict with work  []  No reason given  [x]  Other:     Comments:  Waiting on C-9    Phone call information:   []  Phone call made today to patient at  at number provided:      []  Patient answered, conversation as follows:    []  Patient did not answer, message left as follows:  []  Phone call not made today  [x]  Phone call not needed - pt contacted us to cancel and provided reason for cancellation.      Electronically signed by:  Niraj Mccullough PT

## 2022-12-20 ENCOUNTER — HOSPITAL ENCOUNTER (OUTPATIENT)
Dept: PHYSICAL THERAPY | Age: 52
Setting detail: THERAPIES SERIES
Discharge: HOME | End: 2022-12-20
Payer: COMMERCIAL

## 2022-12-20 NOTE — FLOWSHEET NOTE
[address] [facility]     Physical Therapy  Cancellation/No-show Note  Patient Name:  Ghada Razo  :  1970   Date:  2022  Cancelled visits to date: 5  No-shows to date: 1    Patient status for today's appointment patient:  [x]  Cancelled 10/25, , , 12/15,   []  Rescheduled appointment  []  No-show      Reason given by patient:  []  Patient ill  []  Conflicting appointment  []  No transportation    []  Conflict with work  []  No reason given  [x]  Other:     Comments:  Pt was inadvertently scheduled through 22 secondary to confusion regarding when C-9 was approved through. This was done in error by PT clinic. All scheduled appts cancelled at this time secondary to no auth. Multiple attempts have been made to contact patient's  without success. Pt to go back to doctor at end of December and if further PT needed, can discuss this with her doctor at that time. Phone call information:   [x]  Phone call made today to patient at 8:45 am at number provided:      [x]  Patient answered, conversation as follows: Informed patient of no auth at this time, she is following up with MD at end of December and will determine need for continued PT at that time. []  Patient did not answer, message left as follows:  []  Phone call not made today  []  Phone call not needed - pt contacted us to cancel and provided reason for cancellation.      Electronically signed by:  Sahara Overton PT

## 2022-12-22 ENCOUNTER — APPOINTMENT (OUTPATIENT)
Dept: PHYSICAL THERAPY | Age: 52
End: 2022-12-22
Payer: COMMERCIAL

## 2022-12-27 ENCOUNTER — APPOINTMENT (OUTPATIENT)
Dept: PHYSICAL THERAPY | Age: 52
End: 2022-12-27
Payer: COMMERCIAL

## 2022-12-29 ENCOUNTER — APPOINTMENT (OUTPATIENT)
Dept: PHYSICAL THERAPY | Age: 52
End: 2022-12-29
Payer: COMMERCIAL

## 2023-02-06 NOTE — TELEPHONE ENCOUNTER
Discharge instructions given to pt, verbalized understanding.  Prescriptions sent to pharmacy per dr martinez.  No c/o pain.  Ambulating out to self care in no distress.   Patient called for results on 11/18 however there are no other recent results other than 10/06/2020.

## 2023-05-17 ENCOUNTER — TELEPHONE (OUTPATIENT)
Dept: BARIATRICS/WEIGHT MGMT | Age: 53
End: 2023-05-17

## 2023-05-23 ENCOUNTER — TELEPHONE (OUTPATIENT)
Dept: BARIATRICS/WEIGHT MGMT | Age: 53
End: 2023-05-23

## 2023-05-23 DIAGNOSIS — E78.2 MIXED HYPERLIPIDEMIA: ICD-10-CM

## 2023-05-23 DIAGNOSIS — K44.9 HIATAL HERNIA: ICD-10-CM

## 2023-05-23 DIAGNOSIS — Z98.84 S/P LAPAROSCOPIC SLEEVE GASTRECTOMY: Primary | ICD-10-CM

## 2023-05-23 NOTE — TELEPHONE ENCOUNTER
Pt had labs ordered at previous visit that have since . She was stuck numerous times and they weren't successful so hadn't been back. Would like these reorderd so she can complete at her work.   Her phone# [phone]  if you can please fax them to [phone]

## 2023-06-07 LAB
ALBUMIN SERPL-MCNC: 4.3 G/DL (ref 3.5–5.7)
ALP BLD-CCNC: 55 IU/L (ref 35–135)
ALT SERPL-CCNC: 11 IU/L (ref 10–60)
ANION GAP SERPL CALCULATED.3IONS-SCNC: 8 MMOL/L (ref 4–16)
AST SERPL-CCNC: 15 IU/L (ref 10–40)
BASOPHILS ABSOLUTE: 0.1 THOU/MCL (ref 0–0.2)
BASOPHILS ABSOLUTE: 1 %
BILIRUB SERPL-MCNC: 0.5 MG/DL (ref 0–1.2)
BUN BLDV-MCNC: 17 MG/DL (ref 8–26)
CALCIUM SERPL-MCNC: 9.8 MG/DL (ref 8.5–10.4)
CHLORIDE BLD-SCNC: 105 MEQ/L (ref 98–111)
CHOLESTEROL, TOTAL: 262 MG/DL
CO2: 28 MMOL/L (ref 21–31)
CREAT SERPL-MCNC: 0.91 MG/DL (ref 0.6–1.2)
EGFR (CKD-EPI): 75 ML/MIN/1.73 M2
EOSINOPHILS ABSOLUTE: 0.2 THOU/MCL (ref 0.03–0.45)
EOSINOPHILS RELATIVE PERCENT: 5 %
ESTIMATED AVERAGE GLUCOSE: 88 MG/DL
FOLATE: >20 NG/ML
GLUCOSE BLD-MCNC: 68 MG/DL (ref 70–99)
HBA1C MFR BLD: 4.7 % (ref 4.2–5.6)
HCT VFR BLD CALC: 38.9 % (ref 36–46)
HDLC SERPL-MCNC: 88 MG/DL
HEMOGLOBIN: 12.7 G/DL (ref 12–15.2)
INR BLD: 0.9 (ref 0.8–1.2)
IRON SATURATION: 22 % (ref 15–55)
IRON: 74 MCG/DL (ref 50–212)
LDL CHOLESTEROL CALCULATED: 157 MG/DL
LYMPHOCYTES ABSOLUTE: 2.3 THOU/MCL (ref 1–4)
LYMPHOCYTES RELATIVE PERCENT: 43 %
MCH RBC QN AUTO: 29 PG (ref 27–33)
MCHC RBC AUTO-ENTMCNC: 32.7 G/DL (ref 32–36)
MCV RBC AUTO: 88.6 FL (ref 82–97)
MONOCYTES # BLD: 4 %
MONOCYTES ABSOLUTE: 0.2 THOU/MCL (ref 0.2–0.9)
NEUTROPHILS ABSOLUTE: 2.6 THOU/MCL (ref 1.8–7.7)
NONHDLC SERPL-MCNC: 174 MG/DL
PDW BLD-RTO: 13.8 % (ref 12.3–17)
PLATELET # BLD: 321 THOU/MCL (ref 140–375)
PMV BLD AUTO: 7.8 FL (ref 7.4–11.5)
POTASSIUM SERPL-SCNC: 3.5 MEQ/L (ref 3.6–5.1)
PROTHROMBIN TIME: 12.3 SECONDS (ref 11.7–14.2)
RBC # BLD: 4.38 MIL/MCL (ref 3.8–5.2)
SEG NEUTROPHILS: 47 %
SODIUM BLD-SCNC: 141 MEQ/L (ref 135–145)
TOTAL IRON BINDING CAPACITY: 336 MCG/DL (ref 260–450)
TOTAL PROTEIN: 7.3 G/DL (ref 6–8)
TRANSFERRIN: 240 MG/DL (ref 203–362)
TRIGL SERPL-MCNC: 86 MG/DL
TSH ULTRASENSITIVE: 3.03 MCIU/ML (ref 0.27–4.2)
VITAMIN B-12: 1399 PG/ML (ref 180–914)
VITAMIN D 25-HYDROXY: 69.6 NG/ML (ref 30–150)
WBC # BLD: 5.5 THOU/MCL (ref 3.6–10.5)

## 2023-06-09 LAB
ALPHA-TOCOPHEROL: 15 MG/L (ref 5.5–18)
BETA-GAMA TOCOPHEROL: 1.3 MG/L (ref 0–6)
COMMENT: NORMAL
RETINOL (VITAMIN A): 1.03 MG/L (ref 0.3–1.2)
RETINYL PALMITATE: 0.05 MG/L (ref 0–0.1)
THIAMINE PYROPHOSPHATE, BLOOD: 175 NMOL/L (ref 70–180)

## 2023-08-01 ENCOUNTER — OFFICE VISIT (OUTPATIENT)
Dept: BARIATRICS/WEIGHT MGMT | Age: 53
End: 2023-08-01
Payer: COMMERCIAL

## 2023-08-01 VITALS
BODY MASS INDEX: 28.43 KG/M2 | DIASTOLIC BLOOD PRESSURE: 70 MMHG | HEART RATE: 80 BPM | OXYGEN SATURATION: 98 % | SYSTOLIC BLOOD PRESSURE: 118 MMHG | WEIGHT: 141 LBS | RESPIRATION RATE: 18 BRPM | HEIGHT: 59 IN

## 2023-08-01 DIAGNOSIS — Z98.84 S/P LAPAROSCOPIC SLEEVE GASTRECTOMY: ICD-10-CM

## 2023-08-01 PROCEDURE — 99214 OFFICE O/P EST MOD 30 MIN: CPT | Performed by: SURGERY

## 2023-08-01 NOTE — PROGRESS NOTES
Dietary Assessment Note  Vitals:   Vitals:    08/01/23 0814   BP: 118/70   Pulse: 80   Resp: 18   SpO2: 98%   Weight: 141 lb (64 kg)   Height: 4' 11\" (1.499 m)   Patient gained 2 lbs over past ~9 months. Total Weight Loss: 75.6 lbs    Labs reviewed: Reviewed labs 6/7/23- cholesterol 262 (H) /  (H)    Protein intake: 60-80 grams/day     Fluid intake: =/>64 oz/day - water / margarito / occasional wine 1-2 glasses every 1-2 weeks    Multivitamin/mineral intake: yes - 1 fusion capsule with iron    Calcium intake: yes - 2 calcium soft chews    Other: none    Exercise: yes - walking 4x/wk for 4 miles    Nutrition Assessment: 2 year post-op visit. Pt is eating 4x/day. B- HB eggs w/ turkey hedrick OR protein shake  S- fruit &/OR nuts OR carrots/celery & dressing  L- salad w/ chicken OR crackers & tuna  D- crab OR turkey w/ veggies    Amount able to eat per sitting: ~1 cup volume    Following 30/30/30 rule: yes- sometimes longer with fluids    Food Intolerances/issues:  grease / some beef    Client Concerns: being evaluated for skin removal / recent knee injury ready to get back to exercise more / up a little bit wants to get back to working out more, recent gym injury    Goals:   - Continue plan  - Add steps downtown & increase speed walking to a light jog    Pt likes to be at 125-130 lb.       Plan: f/u as directed    Fernie Palma, RD, LD
RTC in 12 months           Patient advised that its their responsibility to follow up for care, studies, referrals and/or labs ordered today. Please note that some or all of this report was generated using voice recognition software. Please notify me in case of any questions about the content of this document, as some errors in transcription may have occurred .

## 2023-08-01 NOTE — PATIENT INSTRUCTIONS
Patient received dietary handouts and education.     Goals:   - Continue plan  - Add steps downtown & increase speed walking to a light jog

## (undated) DEVICE — GOWN,AURORA,NONREINF,RAGLAN,XXL,STERILE: Brand: MEDLINE

## (undated) DEVICE — LOTION PREP REMV 5OZ IODO CLR TINC OF BENZ DURAPREP

## (undated) DEVICE — MERCY FAIRFIELD TURNOVER KIT: Brand: MEDLINE INDUSTRIES, INC.

## (undated) DEVICE — SHEARS ENDOSCP HARM 36CM ULTRASONIC CRV TIP UPGRD

## (undated) DEVICE — BLANKET WRM W29.9XL79.1IN UP BODY FORC AIR MISTRAL-AIR

## (undated) DEVICE — TROCAR: Brand: KII OPTICAL ACCESS SYSTEM

## (undated) DEVICE — DEVICE SUT 0 L48IN GRN POLY BRAID LD UNIT DISP SURGDAC

## (undated) DEVICE — APPLICATOR PREP 26ML 0.7% IOD POVACRYLEX 74% ISO ALC ST

## (undated) DEVICE — LAPAROSCOPIC SCISSORS: Brand: EPIX LAPAROSCOPIC SCISSORS

## (undated) DEVICE — BW-412T DISP COMBO CLEANING BRUSH: Brand: SINGLE USE COMBINATION CLEANING BRUSH

## (undated) DEVICE — SHEET,DRAPE,40X58,STERILE: Brand: MEDLINE

## (undated) DEVICE — CONTROL SYRINGE LUER-LOCK TIP: Brand: MONOJECT

## (undated) DEVICE — NEEDLE HYPO 22GA L1.5IN BLK POLYPR HUB S STL REG BVL STR

## (undated) DEVICE — TROCAR: Brand: KII FIOS FIRST ENTRY

## (undated) DEVICE — SHELL STPL PWR FOR SIGNIA HNDL STPL SYS

## (undated) DEVICE — ADHESIVE SKIN CLSR 0.7ML TOP DERMBND ADV

## (undated) DEVICE — NEEDLE INSUF L150MM DIA2MM DISP FOR PNEUMOPERI ENDOPATH

## (undated) DEVICE — ARM CRADLE: Brand: DEVON

## (undated) DEVICE — SOLUTION IV IRRIG POUR BRL 0.9% SODIUM CHL 2F7124

## (undated) DEVICE — MOUTHPIECE ENDOSCP L CTRL OPN AND SIDE PORTS DISP

## (undated) DEVICE — RELOAD STPL 2.5MM L60MM 0DEG VASC TISS TAN TI 6 ROW LIN

## (undated) DEVICE — PROCEDURE KIT ENDOSCP CUST

## (undated) DEVICE — LAPAROSCOPY PACK: Brand: MEDLINE INDUSTRIES, INC.

## (undated) DEVICE — BOWL MED L 32OZ PLAS W/ MOLD GRAD EZ OPN PEEL PCH

## (undated) DEVICE — Device

## (undated) DEVICE — TROCARS: Brand: KII® OPTICAL ACCESS SYSTEM

## (undated) DEVICE — SOLUTION IV IRRIG WATER 500ML POUR BRL ST 2F7113

## (undated) DEVICE — DRAPE,LAP,CHOLE,W/TROUGHS,STERILE: Brand: MEDLINE

## (undated) DEVICE — FORCEPS BX L240CM WRK CHN 2.8MM STD CAP W/ NDL MIC MESH

## (undated) DEVICE — DEVICE SUT W/ SZ 0 L48IN VLT POLYSRB SUT DISP ES-9 ENDO

## (undated) DEVICE — SUTURE ETHBND EXCEL SZ 0 L30IN NONABSORBABLE GRN CT1 L36MM X424H

## (undated) DEVICE — 30977 SEE SHARP - ENHANCED INTRAOPERATIVE LAPAROSCOPE CLEANING & DEFOGGING: Brand: 30977 SEE SHARP - ENHANCED INTRAOPERATIVE LAPAROSCOPE CLEANING & DEFOGGING

## (undated) DEVICE — AIR SHEET,LAT,COMFORT GLIDE, BLEND 40X80: Brand: MEDLINE

## (undated) DEVICE — SUTURE VCRL SZ 0 L54IN ABSRB UD POLYGLACTIN 910 COAT BRAID J608H

## (undated) DEVICE — TRUE CONTENT TO BE POPULATED AS PART OF REBRANDING: Brand: ARGYLE

## (undated) DEVICE — DEVICE SUT SHFT L34CM DIA 10MM 2 JAW LD UNIT ENDOSTCH

## (undated) DEVICE — CRADLE ANK AND FT ELEV FLAT END POLY FOAM W/ VENT H NEUT

## (undated) DEVICE — PASSIVE LAPSCP FLTR W/ 1/4INX24 TBNG AND M LUER LCK FIT - U

## (undated) DEVICE — RELOAD STPL 3.5MM L60MM 0DEG UNIV TISS PUR TI 6 ROW LIN

## (undated) DEVICE — SPONGE,LAP,4"X18",XR,ST,5/PK,40PK/CS: Brand: MEDLINE INDUSTRIES, INC.

## (undated) DEVICE — STERILE POLYISOPRENE POWDER-FREE SURGICAL GLOVES: Brand: PROTEXIS

## (undated) DEVICE — SHEET,DRAPE,53X77,STERILE: Brand: MEDLINE

## (undated) DEVICE — SUTURE VCRL SZ 4-0 L18IN ABSRB UD L19MM PS-2 3/8 CIR PRIM J496H

## (undated) DEVICE — SET VLV 3 PC AWS DISPOSABLE GRDIAN SCOPEVALET